# Patient Record
Sex: MALE | Race: WHITE | NOT HISPANIC OR LATINO | Employment: OTHER | URBAN - METROPOLITAN AREA
[De-identification: names, ages, dates, MRNs, and addresses within clinical notes are randomized per-mention and may not be internally consistent; named-entity substitution may affect disease eponyms.]

---

## 2023-03-06 LAB
ALBUMIN (G/DL) IN SER/PLAS: 4.2 G/DL (ref 3.4–5)
ANION GAP IN SER/PLAS: 11 MMOL/L (ref 10–20)
APPEARANCE, URINE: CLEAR
BILIRUBIN, URINE: NEGATIVE
BLOOD, URINE: NEGATIVE
CALCIUM (MG/DL) IN SER/PLAS: 10.4 MG/DL (ref 8.6–10.3)
CARBON DIOXIDE, TOTAL (MMOL/L) IN SER/PLAS: 31 MMOL/L (ref 21–32)
CHLORIDE (MMOL/L) IN SER/PLAS: 101 MMOL/L (ref 98–107)
COLOR, URINE: YELLOW
CREATININE (MG/DL) IN SER/PLAS: 1.69 MG/DL (ref 0.5–1.3)
CREATININE (MG/DL) IN URINE: 108 MG/DL (ref 20–370)
ERYTHROCYTE DISTRIBUTION WIDTH (RATIO) BY AUTOMATED COUNT: 14.6 % (ref 11.5–14.5)
ERYTHROCYTE MEAN CORPUSCULAR HEMOGLOBIN CONCENTRATION (G/DL) BY AUTOMATED: 32.1 G/DL (ref 32–36)
ERYTHROCYTE MEAN CORPUSCULAR VOLUME (FL) BY AUTOMATED COUNT: 94 FL (ref 80–100)
ERYTHROCYTES (10*6/UL) IN BLOOD BY AUTOMATED COUNT: 4.68 X10E12/L (ref 4.5–5.9)
GFR MALE: 39 ML/MIN/1.73M2
GLUCOSE (MG/DL) IN SER/PLAS: 102 MG/DL (ref 74–99)
GLUCOSE, URINE: NEGATIVE MG/DL
HEMATOCRIT (%) IN BLOOD BY AUTOMATED COUNT: 43.9 % (ref 41–52)
HEMOGLOBIN (G/DL) IN BLOOD: 14.1 G/DL (ref 13.5–17.5)
HYALINE CASTS, URINE: ABNORMAL /LPF
KETONES, URINE: NEGATIVE MG/DL
LEUKOCYTE ESTERASE, URINE: NEGATIVE
LEUKOCYTES (10*3/UL) IN BLOOD BY AUTOMATED COUNT: 7 X10E9/L (ref 4.4–11.3)
MUCUS, URINE: ABNORMAL /LPF
NITRITE, URINE: NEGATIVE
PH, URINE: 5 (ref 5–8)
PHOSPHATE (MG/DL) IN SER/PLAS: 3.3 MG/DL (ref 2.5–4.9)
PLATELETS (10*3/UL) IN BLOOD AUTOMATED COUNT: 199 X10E9/L (ref 150–450)
POTASSIUM (MMOL/L) IN SER/PLAS: 3.8 MMOL/L (ref 3.5–5.3)
PROTEIN (MG/DL) IN URINE: 103 MG/DL (ref 5–25)
PROTEIN, URINE: ABNORMAL MG/DL
PROTEIN/CREATININE (MG/MG) IN URINE: 0.95 MG/MG CREAT (ref 0–0.17)
RBC, URINE: 1 /HPF (ref 0–5)
SODIUM (MMOL/L) IN SER/PLAS: 139 MMOL/L (ref 136–145)
SPECIFIC GRAVITY, URINE: 1.02 (ref 1–1.03)
SQUAMOUS EPITHELIAL CELLS, URINE: <1 /HPF
UREA NITROGEN (MG/DL) IN SER/PLAS: 35 MG/DL (ref 6–23)
UROBILINOGEN, URINE: <2 MG/DL (ref 0–1.9)
WBC, URINE: <1 /HPF (ref 0–5)

## 2023-05-31 PROBLEM — B36.9 OTOMYCOSIS: Status: ACTIVE | Noted: 2023-05-31

## 2023-05-31 PROBLEM — I10 HTN (HYPERTENSION): Status: ACTIVE | Noted: 2023-05-31

## 2023-05-31 PROBLEM — E78.5 HYPERLIPIDEMIA: Status: ACTIVE | Noted: 2023-05-31

## 2023-05-31 PROBLEM — M10.9 GOUT: Status: ACTIVE | Noted: 2023-05-31

## 2023-05-31 PROBLEM — H40.9 GLAUCOMA: Status: ACTIVE | Noted: 2023-05-31

## 2023-05-31 PROBLEM — R79.89 LOW VITAMIN D LEVEL: Status: ACTIVE | Noted: 2023-05-31

## 2023-05-31 PROBLEM — E07.9 THYROID DISORDER: Status: ACTIVE | Noted: 2023-05-31

## 2023-05-31 PROBLEM — H40.9 GLAUCOMA: Status: RESOLVED | Noted: 2023-05-31 | Resolved: 2023-05-31

## 2023-05-31 PROBLEM — N32.81 OVERACTIVE BLADDER: Status: ACTIVE | Noted: 2023-05-31

## 2023-05-31 PROBLEM — R73.9 HYPERGLYCEMIA: Status: ACTIVE | Noted: 2023-05-31

## 2023-05-31 PROBLEM — N18.31 CKD STAGE G3A/A2, GFR 45-59 AND ALBUMIN CREATININE RATIO 30-299 MG/G (MULTI): Status: ACTIVE | Noted: 2023-05-31

## 2023-05-31 PROBLEM — H62.40 OTOMYCOSIS: Status: ACTIVE | Noted: 2023-05-31

## 2023-05-31 PROBLEM — C61 MALIGNANT NEOPLASM OF PROSTATE (MULTI): Status: ACTIVE | Noted: 2023-05-31

## 2023-06-06 LAB
ALBUMIN (G/DL) IN SER/PLAS: 4.2 G/DL (ref 3.4–5)
ANION GAP IN SER/PLAS: 10 MMOL/L (ref 10–20)
APPEARANCE, URINE: CLEAR
BILIRUBIN, URINE: NEGATIVE
BLOOD, URINE: NEGATIVE
CALCIDIOL (25 OH VITAMIN D3) (NG/ML) IN SER/PLAS: 32 NG/ML
CALCIUM (MG/DL) IN SER/PLAS: 10.3 MG/DL (ref 8.6–10.3)
CARBON DIOXIDE, TOTAL (MMOL/L) IN SER/PLAS: 30 MMOL/L (ref 21–32)
CHLORIDE (MMOL/L) IN SER/PLAS: 106 MMOL/L (ref 98–107)
COLOR, URINE: YELLOW
CREATININE (MG/DL) IN SER/PLAS: 1.34 MG/DL (ref 0.5–1.3)
CREATININE (MG/DL) IN URINE: 84.9 MG/DL (ref 20–370)
GFR MALE: 51 ML/MIN/1.73M2
GLUCOSE (MG/DL) IN SER/PLAS: 99 MG/DL (ref 74–99)
GLUCOSE, URINE: NEGATIVE MG/DL
KETONES, URINE: NEGATIVE MG/DL
LEUKOCYTE ESTERASE, URINE: NEGATIVE
MUCUS, URINE: NORMAL /LPF
NITRITE, URINE: NEGATIVE
PARATHYRIN INTACT (PG/ML) IN SER/PLAS: 73.4 PG/ML (ref 18.5–88)
PH, URINE: 5 (ref 5–8)
PHOSPHATE (MG/DL) IN SER/PLAS: 3.6 MG/DL (ref 2.5–4.9)
POTASSIUM (MMOL/L) IN SER/PLAS: 3.9 MMOL/L (ref 3.5–5.3)
PROTEIN (MG/DL) IN URINE: 122 MG/DL (ref 5–25)
PROTEIN, URINE: ABNORMAL MG/DL
PROTEIN/CREATININE (MG/MG) IN URINE: 1.44 MG/MG CREAT (ref 0–0.17)
RBC, URINE: <1 /HPF (ref 0–5)
SODIUM (MMOL/L) IN SER/PLAS: 142 MMOL/L (ref 136–145)
SPECIFIC GRAVITY, URINE: 1.02 (ref 1–1.03)
SQUAMOUS EPITHELIAL CELLS, URINE: <1 /HPF
UREA NITROGEN (MG/DL) IN SER/PLAS: 36 MG/DL (ref 6–23)
UROBILINOGEN, URINE: <2 MG/DL (ref 0–1.9)
WBC, URINE: 1 /HPF (ref 0–5)

## 2023-06-08 LAB
ALBUMIN ELP: 3.9 G/DL (ref 3.4–5)
ALPHA 1: 0.3 G/DL (ref 0.2–0.6)
ALPHA 2: 0.8 G/DL (ref 0.4–1.1)
BETA: 0.8 G/DL (ref 0.5–1.2)
GAMMA GLOBULIN: 1.3 G/DL (ref 0.5–1.4)
PATH REVIEW-SERUM PROTEIN ELECTROPHORESIS: NORMAL
PROTEIN ELECTROPHORESIS INTERPRETATION: NORMAL
PROTEIN TOTAL: 7.1 G/DL (ref 6.4–8.2)

## 2023-06-14 ENCOUNTER — OFFICE VISIT (OUTPATIENT)
Dept: PRIMARY CARE | Facility: CLINIC | Age: 88
End: 2023-06-14
Payer: MEDICARE

## 2023-06-14 VITALS
TEMPERATURE: 97.8 F | HEART RATE: 51 BPM | OXYGEN SATURATION: 97 % | SYSTOLIC BLOOD PRESSURE: 122 MMHG | RESPIRATION RATE: 18 BRPM | DIASTOLIC BLOOD PRESSURE: 64 MMHG | BODY MASS INDEX: 38.22 KG/M2 | WEIGHT: 267 LBS | HEIGHT: 70 IN

## 2023-06-14 DIAGNOSIS — E66.01 OBESITY, MORBID (MULTI): Primary | ICD-10-CM

## 2023-06-14 DIAGNOSIS — Z00.00 ENCOUNTER FOR MEDICARE ANNUAL WELLNESS EXAM: ICD-10-CM

## 2023-06-14 DIAGNOSIS — I10 HYPERTENSION, UNSPECIFIED TYPE: ICD-10-CM

## 2023-06-14 DIAGNOSIS — E78.2 MIXED HYPERLIPIDEMIA: ICD-10-CM

## 2023-06-14 DIAGNOSIS — R73.9 HYPERGLYCEMIA: ICD-10-CM

## 2023-06-14 DIAGNOSIS — Z12.5 PROSTATE CANCER SCREENING: ICD-10-CM

## 2023-06-14 DIAGNOSIS — N18.31 CKD STAGE G3A/A2, GFR 45-59 AND ALBUMIN CREATININE RATIO 30-299 MG/G (MULTI): ICD-10-CM

## 2023-06-14 DIAGNOSIS — M10.9 GOUT, UNSPECIFIED CAUSE, UNSPECIFIED CHRONICITY, UNSPECIFIED SITE: ICD-10-CM

## 2023-06-14 DIAGNOSIS — E07.9 THYROID DISORDER: ICD-10-CM

## 2023-06-14 DIAGNOSIS — C61 MALIGNANT NEOPLASM OF PROSTATE (MULTI): ICD-10-CM

## 2023-06-14 PROBLEM — N18.9 CKD (CHRONIC KIDNEY DISEASE): Status: ACTIVE | Noted: 2021-05-20

## 2023-06-14 PROBLEM — H40.89 OTHER SPECIFIED GLAUCOMA: Status: ACTIVE | Noted: 2021-05-20

## 2023-06-14 PROBLEM — M17.11 PRIMARY OSTEOARTHRITIS OF RIGHT KNEE: Status: ACTIVE | Noted: 2021-05-12

## 2023-06-14 PROBLEM — E78.5 HLD (HYPERLIPIDEMIA): Status: ACTIVE | Noted: 2021-05-20

## 2023-06-14 PROBLEM — M17.12 PRIMARY OSTEOARTHRITIS OF LEFT KNEE: Status: ACTIVE | Noted: 2021-05-12

## 2023-06-14 PROBLEM — E66.9 OBESITY (BMI 30-39.9): Status: ACTIVE | Noted: 2021-05-20

## 2023-06-14 PROCEDURE — 1159F MED LIST DOCD IN RCRD: CPT | Performed by: FAMILY MEDICINE

## 2023-06-14 PROCEDURE — 1170F FXNL STATUS ASSESSED: CPT | Performed by: FAMILY MEDICINE

## 2023-06-14 PROCEDURE — 3074F SYST BP LT 130 MM HG: CPT | Performed by: FAMILY MEDICINE

## 2023-06-14 PROCEDURE — G0439 PPPS, SUBSEQ VISIT: HCPCS | Performed by: FAMILY MEDICINE

## 2023-06-14 PROCEDURE — 3078F DIAST BP <80 MM HG: CPT | Performed by: FAMILY MEDICINE

## 2023-06-14 PROCEDURE — 1036F TOBACCO NON-USER: CPT | Performed by: FAMILY MEDICINE

## 2023-06-14 PROCEDURE — 99213 OFFICE O/P EST LOW 20 MIN: CPT | Performed by: FAMILY MEDICINE

## 2023-06-14 RX ORDER — ATORVASTATIN CALCIUM 20 MG/1
20 TABLET, FILM COATED ORAL DAILY
COMMUNITY
End: 2023-11-15 | Stop reason: SDUPTHER

## 2023-06-14 RX ORDER — HYDRALAZINE HYDROCHLORIDE 50 MG/1
50 TABLET, FILM COATED ORAL 3 TIMES DAILY
COMMUNITY
End: 2023-11-15 | Stop reason: SDUPTHER

## 2023-06-14 RX ORDER — GLUCOSAMINE/CHONDR SU A SOD 167-133 MG
1000 CAPSULE ORAL
COMMUNITY

## 2023-06-14 RX ORDER — ZINC GLUCONATE 50 MG
TABLET ORAL
COMMUNITY
Start: 2018-02-13

## 2023-06-14 RX ORDER — METOPROLOL SUCCINATE 100 MG/1
100 TABLET, EXTENDED RELEASE ORAL DAILY
COMMUNITY
End: 2023-11-15 | Stop reason: SDUPTHER

## 2023-06-14 RX ORDER — LOSARTAN POTASSIUM 100 MG/1
100 TABLET ORAL DAILY
COMMUNITY
End: 2023-11-15 | Stop reason: SDUPTHER

## 2023-06-14 RX ORDER — ACETAMINOPHEN 500 MG
TABLET ORAL
COMMUNITY

## 2023-06-14 RX ORDER — LEVOTHYROXINE SODIUM 50 UG/1
50 TABLET ORAL
COMMUNITY
End: 2023-11-15 | Stop reason: SDUPTHER

## 2023-06-14 RX ORDER — HYDROCHLOROTHIAZIDE 25 MG/1
25 TABLET ORAL
COMMUNITY
Start: 2023-05-29 | End: 2023-11-15 | Stop reason: SDUPTHER

## 2023-06-14 RX ORDER — FLAXSEED OIL 1000 MG
2000 CAPSULE ORAL
COMMUNITY
Start: 2018-02-13

## 2023-06-14 RX ORDER — MULTIVITAMIN
TABLET ORAL
COMMUNITY

## 2023-06-14 RX ORDER — AMLODIPINE BESYLATE 5 MG/1
5 TABLET ORAL 2 TIMES DAILY
COMMUNITY
End: 2023-11-15 | Stop reason: SDUPTHER

## 2023-06-14 RX ORDER — ASPIRIN 81 MG/1
81 TABLET ORAL DAILY
COMMUNITY

## 2023-06-14 ASSESSMENT — PATIENT HEALTH QUESTIONNAIRE - PHQ9
1. LITTLE INTEREST OR PLEASURE IN DOING THINGS: NOT AT ALL
2. FEELING DOWN, DEPRESSED OR HOPELESS: NOT AT ALL
SUM OF ALL RESPONSES TO PHQ9 QUESTIONS 1 AND 2: 0

## 2023-06-14 ASSESSMENT — ENCOUNTER SYMPTOMS
DEPRESSION: 0
LOSS OF SENSATION IN FEET: 0
OCCASIONAL FEELINGS OF UNSTEADINESS: 1

## 2023-06-14 ASSESSMENT — ACTIVITIES OF DAILY LIVING (ADL)
BATHING: INDEPENDENT
DRESSING: INDEPENDENT
MANAGING_FINANCES: INDEPENDENT
GROCERY_SHOPPING: INDEPENDENT
DOING_HOUSEWORK: INDEPENDENT
TAKING_MEDICATION: INDEPENDENT

## 2023-06-14 NOTE — PROGRESS NOTES
Subjective   Reason for Visit: Jl Brand is a 87 y.o. male here for a Medicare Wellness visit.   Covid vax: x 4  CRC: 2017 wnl  CHECKLIST REVIEWED AND COMPLETE FOR AMW  No depression  No dementia  Non smoker  Full code desired  Hba1c 5.4  Sees nephrology  Offered cardio    Past Medical, Surgical, and Family History reviewed and updated in chart.    Reviewed all medications by prescribing practitioner or clinical pharmacist (such as prescriptions, OTCs, herbal therapies and supplements) and documented in the medical record.  Medicare Annual Wellness Visit Subsequent, Hypertension, Hyperlipidemia, Hypothyroidism, and Chronic Kidney Disease  HPI    Patient Self Assessment of Health Status  Patient Self Assessment: Good    Nutrition and Exercise  Current Diet: HEALTHY Diet always best, minimizing excess carbs  Adequate Fluid Intake: Yes  Caffeine: -aware to minimize intake  Exercise Frequency: Regularly advised    Functional Ability/Level of Safety  Cognitive Impairment Observed: No cognitive impairment observed    Home Safety Risk Factors: None    Patient Care Team:  Helen Hooper MD as PCP - General    HPI  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 and albumin creatinine ratio  mg/g    Gout    HTN (hypertension)    Hyperglycemia    Hyperlipidemia    Low vitamin D level    Malignant neoplasm of prostate (CMS/[facility])    Otomycosis    Overactive bladder    Thyroid disorder    Obesity, morbid (CMS/[facility])    Obesity (BMI 30-39.9)    Primary osteoarthritis of left knee    Primary osteoarthritis of right knee    CKD (chronic kidney disease)    Other specified glaucoma    Essential hypertension    HLD (hyperlipidemia)      Past Surgical History:   Procedure Laterality Date    CATARACT EXTRACTION  2015    COLONOSCOPY  2017    TOTAL KNEE ARTHROPLASTY Right 2021       Review of Systems no cva no cad  No new issues  Sees nephro-saw today    This patient has   NO history of recent Covid  "nor flu symptoms,  NO Fever nor chills,  NO Chest pain, shortness of breath nor paroxysmal nocturnal dyspnea,  NO Nausea, vomiting, nor diarrhea,  NO Hematochezia nor melena,  NO Dysuria, hematuria, nor new incontinence issues  NO new severe headaches nor neurological complaints,  NO new issues with anxiety nor depression nor new psychiatric complaints,  NO suicidal nor homicidal ideations.     OBJECTIVE:  /64   Pulse 51   Temp 36.6 °C (97.8 °F) (Temporal)   Resp 18   Ht 1.778 m (5' 10\")   Wt 121 kg (267 lb)   SpO2 97%   BMI 38.31 kg/m²      General:  alert, oriented, no acute distress.  No obvious skin rashes noted.   No gait disturbance noted.    Mood is pleasant, not tearful, no signs of emotional distress.  Not appearing intoxicated or altered.   No voiced delusions,   Normal, appropriate behavior.    HEENT: Normocephalic, atraumatic,   Pupils round, reactive to light  Extraocular motions intact and wnl  Tympanic membranes normal    Neck: no nuchal rigidity  No masses palpable.  No carotid bruits.  No thyromegaly.    Respiratory: Equal breath sounds  No wheezes,    rales,    nor rhonchi  No respiratory distress.    Heart: Regular rate and rhythm, no    murmurs  no rubs/gallops    Abdomen: no masses palpable, no rebound nor guarding, no rebound nor guarding overwt.    Extremities: NO cyanosis noted, no clubbing.   BLE mild edema noted.  2+dorsalis pedis pulses.    Normal-not antalgic, steady gait.  H of h  Orders Only on 06/06/2023   Component Date Value Ref Range Status    Glucose 06/06/2023 99  74 - 99 mg/dL Final    Sodium 06/06/2023 142  136 - 145 mmol/L Final    Potassium 06/06/2023 3.9  3.5 - 5.3 mmol/L Final    Chloride 06/06/2023 106  98 - 107 mmol/L Final    Bicarbonate 06/06/2023 30  21 - 32 mmol/L Final    Anion Gap 06/06/2023 10  10 - 20 mmol/L Final    Urea Nitrogen 06/06/2023 36 (H)  6 - 23 mg/dL Final    Creatinine 06/06/2023 1.34 (H)  0.50 - 1.30 mg/dL Final    GFR MALE 06/06/2023 51 " (A)  >90 mL/min/1.73m2 Final    Comment:  CALCULATIONS OF ESTIMATED GFR ARE PERFORMED   USING THE 2021 CKD-EPI STUDY REFIT EQUATION   WITHOUT THE RACE VARIABLE FOR THE IDMS-TRACEABLE   CREATININE METHODS.    https://jasn.asnjournals.org/content/early/2021/09/22/ASN.8098614961      Calcium 06/06/2023 10.3  8.6 - 10.3 mg/dL Final    Phosphorus 06/06/2023 3.6  2.5 - 4.9 mg/dL Final    Comment:  The performance characteristics of phosphorus testing in   heparinized plasma have been validated by the individual     laboratory site where testing is performed. Testing    on heparinized plasma is not approved by the FDA;    however, such approval is not necessary.      Albumin 06/06/2023 4.2  3.4 - 5.0 g/dL Final    Color, Urine 06/06/2023 YELLOW  STRAW,YELLOW Final    Appearance, Urine 06/06/2023 CLEAR  CLEAR Final    Specific Gravity, Urine 06/06/2023 1.016  1.005 - 1.035 Final    pH, Urine 06/06/2023 5.0  5.0 - 8.0 Final    Protein, Urine 06/06/2023 100 (2+) (A)  NEGATIVE mg/dL Final    Glucose, Urine 06/06/2023 NEGATIVE  NEGATIVE mg/dL Final    Blood, Urine 06/06/2023 NEGATIVE  NEGATIVE Final    Ketones, Urine 06/06/2023 NEGATIVE  NEGATIVE mg/dL Final    Bilirubin, Urine 06/06/2023 NEGATIVE  NEGATIVE Final    Urobilinogen, Urine 06/06/2023 <2.0  0.0 - 1.9 mg/dL Final    Nitrite, Urine 06/06/2023 NEGATIVE  NEGATIVE Final    Leukocyte Esterase, Urine 06/06/2023 NEGATIVE  NEGATIVE Final    PTH 06/06/2023 73.4  18.5 - 88.0 pg/mL Final    Protein, Ur 06/06/2023 122 (H)  5 - 25 mg/dL Final    Creatinine, Urine 06/06/2023 84.9  20.0 - 370.0 mg/dL Final    Prot/Creat, Ur 06/06/2023 1.44 (H)  0.00 - 0.17 mg/mg Creat Final    Vitamin D, 25-Hydroxy 06/06/2023 32  ng/mL Final    Comment: .  DEFICIENCY:         < 20   NG/ML  INSUFFICIENCY:      20-29  NG/ML  SUFFICIENCY:         NG/ML    THIS ASSAY ACCURATELY QUANTIFIES THE SUM OF  VITAMIN D3, 25-HYDROXY AND VIT D2,25-HYDROXY.      Total Protein 06/06/2023 7.1  6.4 - 8.2 g/dL  Final    Albumin 06/06/2023 3.9  3.4 - 5.0 g/dL Final    Alpha 1 06/06/2023 0.3  0.2 - 0.6 g/dL Final    Alpha 2 06/06/2023 0.8  0.4 - 1.1 g/dL Final    Beta 06/06/2023 0.8  0.5 - 1.2 g/dL Final    Gamma 06/06/2023 1.3  0.5 - 1.4 g/dL Final    SPE Interpretation 06/06/2023 NORMAL   Final    Path Review - Serum Protein Electr* 06/06/2023 L.[email or web address]   Final    Comment:  By her/his signature above, the Pathologist   listed as making the final interpretation   certifies that she/he has personally reviewed    this case.  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 and albumin creatinine ratio  mg/g    Relevant Orders    Comprehensive Metabolic Panel    Lipid Panel    Gout    HTN (hypertension)    Hyperglycemia    Relevant Orders    Hemoglobin A1C    Hyperlipidemia    Relevant Orders    CBC and Auto Differential    Comprehensive Metabolic Panel    Lipid Panel    Malignant neoplasm of prostate (CMS/HCC)    Thyroid disorder    Relevant Orders    Thyroid Stimulating Hormone    Thyroxine, Free    Obesity, morbid (CMS/HCC) - Primary    Relevant Orders    Hemoglobin A1C     Other Visit Diagnoses       Encounter for Medicare annual wellness exam        Prostate cancer screening        Relevant Orders    Prostate Spec.Ag,Screen          Advance Care Planning Note   Discussion Date: 06/14/23   Discussion Participants: patient    The patient wishes to discuss Advance Care Planning today and the following is a brief summary of our discussion.     Patient has capacity to make their own medical decisions: Yes  Health Care Agent/Surrogate Decision Maker documented in chart: Yes  Documents on file and valid:  Advance Directive/Living  Will: Yes   Health Care Power of : Yes  Communication of Medical Status/Prognosis:   yes   Communication of Treatment Goals/Options:   yes  Treatment Decisions  yes  Time Statement: Total face to face time spent on advance care planning was <30 minutes with <30 minutes spent in counseling, including the explanation.    SEE ME AT NEXT REGULARLY SCHEDULED VISIT-sooner if condition deteriorates or new problems arise.    See me 4mo and labs first  The patient is aware that results will be forthcoming of ALL planned labs and or tests. If no results are received on my chart or by letter within 1 - 3 weeks, the patient is aware they need to call to obtain results, as this is not usual. Also, if any new conditions arise, or current condition worsens, it is understood that sooner appointment should be made or urgent care/convenient care or emergency room treatment should be sought depending on severity. Otherwise follow up for recheck at regular intervals as we have discussed, at least yearly.  Offered cardio-declines for now-no sxs

## 2023-11-07 ENCOUNTER — LAB (OUTPATIENT)
Dept: LAB | Facility: LAB | Age: 88
End: 2023-11-07
Payer: MEDICARE

## 2023-11-07 DIAGNOSIS — R73.9 HYPERGLYCEMIA: ICD-10-CM

## 2023-11-07 DIAGNOSIS — E66.01 OBESITY, MORBID (MULTI): ICD-10-CM

## 2023-11-07 DIAGNOSIS — E07.9 THYROID DISORDER: ICD-10-CM

## 2023-11-07 DIAGNOSIS — E78.2 MIXED HYPERLIPIDEMIA: ICD-10-CM

## 2023-11-07 DIAGNOSIS — Z12.5 PROSTATE CANCER SCREENING: ICD-10-CM

## 2023-11-07 DIAGNOSIS — N18.31 CKD STAGE G3A/A2, GFR 45-59 AND ALBUMIN CREATININE RATIO 30-299 MG/G (MULTI): ICD-10-CM

## 2023-11-07 LAB
ALBUMIN SERPL BCP-MCNC: 4.2 G/DL (ref 3.4–5)
ALP SERPL-CCNC: 68 U/L (ref 33–136)
ALT SERPL W P-5'-P-CCNC: 18 U/L (ref 10–52)
ANION GAP SERPL CALC-SCNC: 12 MMOL/L (ref 10–20)
AST SERPL W P-5'-P-CCNC: 12 U/L (ref 9–39)
BASOPHILS # BLD AUTO: 0.07 X10*3/UL (ref 0–0.1)
BASOPHILS NFR BLD AUTO: 1 %
BILIRUB SERPL-MCNC: 0.8 MG/DL (ref 0–1.2)
BUN SERPL-MCNC: 27 MG/DL (ref 6–23)
CALCIUM SERPL-MCNC: 10.1 MG/DL (ref 8.6–10.3)
CHLORIDE SERPL-SCNC: 103 MMOL/L (ref 98–107)
CHOLEST SERPL-MCNC: 112 MG/DL (ref 0–199)
CHOLESTEROL/HDL RATIO: 3.2
CO2 SERPL-SCNC: 29 MMOL/L (ref 21–32)
CREAT SERPL-MCNC: 1.7 MG/DL (ref 0.5–1.3)
EOSINOPHIL # BLD AUTO: 0.23 X10*3/UL (ref 0–0.4)
EOSINOPHIL NFR BLD AUTO: 3.4 %
ERYTHROCYTE [DISTWIDTH] IN BLOOD BY AUTOMATED COUNT: 14.2 % (ref 11.5–14.5)
EST. AVERAGE GLUCOSE BLD GHB EST-MCNC: 108 MG/DL
GFR SERPL CREATININE-BSD FRML MDRD: 39 ML/MIN/1.73M*2
GLUCOSE SERPL-MCNC: 93 MG/DL (ref 74–99)
HBA1C MFR BLD: 5.4 %
HCT VFR BLD AUTO: 43.6 % (ref 41–52)
HDLC SERPL-MCNC: 35.4 MG/DL
HGB BLD-MCNC: 14.5 G/DL (ref 13.5–17.5)
IMM GRANULOCYTES # BLD AUTO: 0.03 X10*3/UL (ref 0–0.5)
IMM GRANULOCYTES NFR BLD AUTO: 0.4 % (ref 0–0.9)
LDLC SERPL CALC-MCNC: 54 MG/DL
LYMPHOCYTES # BLD AUTO: 1.92 X10*3/UL (ref 0.8–3)
LYMPHOCYTES NFR BLD AUTO: 28.3 %
MCH RBC QN AUTO: 30.7 PG (ref 26–34)
MCHC RBC AUTO-ENTMCNC: 33.3 G/DL (ref 32–36)
MCV RBC AUTO: 92 FL (ref 80–100)
MONOCYTES # BLD AUTO: 0.62 X10*3/UL (ref 0.05–0.8)
MONOCYTES NFR BLD AUTO: 9.1 %
NEUTROPHILS # BLD AUTO: 3.91 X10*3/UL (ref 1.6–5.5)
NEUTROPHILS NFR BLD AUTO: 57.8 %
NON HDL CHOLESTEROL: 77 MG/DL (ref 0–149)
NRBC BLD-RTO: 0 /100 WBCS (ref 0–0)
PLATELET # BLD AUTO: 193 X10*3/UL (ref 150–450)
POTASSIUM SERPL-SCNC: 3.9 MMOL/L (ref 3.5–5.3)
PROT SERPL-MCNC: 7.2 G/DL (ref 6.4–8.2)
PSA SERPL-MCNC: <0.01 NG/ML
RBC # BLD AUTO: 4.73 X10*6/UL (ref 4.5–5.9)
SODIUM SERPL-SCNC: 140 MMOL/L (ref 136–145)
T4 FREE SERPL-MCNC: 0.95 NG/DL (ref 0.61–1.12)
TRIGL SERPL-MCNC: 112 MG/DL (ref 0–149)
TSH SERPL-ACNC: 4.43 MIU/L (ref 0.44–3.98)
VLDL: 22 MG/DL (ref 0–40)
WBC # BLD AUTO: 6.8 X10*3/UL (ref 4.4–11.3)

## 2023-11-07 PROCEDURE — 80053 COMPREHEN METABOLIC PANEL: CPT

## 2023-11-07 PROCEDURE — 84443 ASSAY THYROID STIM HORMONE: CPT

## 2023-11-07 PROCEDURE — 83036 HEMOGLOBIN GLYCOSYLATED A1C: CPT

## 2023-11-07 PROCEDURE — G0103 PSA SCREENING: HCPCS

## 2023-11-07 PROCEDURE — 80061 LIPID PANEL: CPT

## 2023-11-07 PROCEDURE — 85025 COMPLETE CBC W/AUTO DIFF WBC: CPT

## 2023-11-07 PROCEDURE — 84439 ASSAY OF FREE THYROXINE: CPT

## 2023-11-07 PROCEDURE — 36415 COLL VENOUS BLD VENIPUNCTURE: CPT

## 2023-11-09 PROBLEM — N18.9 CKD (CHRONIC KIDNEY DISEASE): Status: RESOLVED | Noted: 2021-05-20 | Resolved: 2023-11-09

## 2023-11-09 PROBLEM — E78.5 HYPERLIPIDEMIA: Status: RESOLVED | Noted: 2023-05-31 | Resolved: 2023-11-09

## 2023-11-09 PROBLEM — I10 HTN (HYPERTENSION): Status: RESOLVED | Noted: 2023-05-31 | Resolved: 2023-11-09

## 2023-11-15 ENCOUNTER — OFFICE VISIT (OUTPATIENT)
Dept: PRIMARY CARE | Facility: CLINIC | Age: 88
End: 2023-11-15
Payer: MEDICARE

## 2023-11-15 VITALS
HEART RATE: 50 BPM | SYSTOLIC BLOOD PRESSURE: 126 MMHG | TEMPERATURE: 97.5 F | HEIGHT: 70 IN | RESPIRATION RATE: 16 BRPM | WEIGHT: 264 LBS | DIASTOLIC BLOOD PRESSURE: 72 MMHG | BODY MASS INDEX: 37.8 KG/M2 | OXYGEN SATURATION: 97 %

## 2023-11-15 DIAGNOSIS — C61 MALIGNANT NEOPLASM OF PROSTATE (MULTI): ICD-10-CM

## 2023-11-15 DIAGNOSIS — N18.31 CKD STAGE G3A/A2, GFR 45-59 AND ALBUMIN CREATININE RATIO 30-299 MG/G (MULTI): ICD-10-CM

## 2023-11-15 DIAGNOSIS — E78.5 HYPERLIPIDEMIA, UNSPECIFIED HYPERLIPIDEMIA TYPE: ICD-10-CM

## 2023-11-15 DIAGNOSIS — E07.9 THYROID DISORDER: ICD-10-CM

## 2023-11-15 DIAGNOSIS — I10 ESSENTIAL HYPERTENSION: ICD-10-CM

## 2023-11-15 DIAGNOSIS — Z23 NEED FOR VACCINATION: ICD-10-CM

## 2023-11-15 DIAGNOSIS — R73.9 HYPERGLYCEMIA: ICD-10-CM

## 2023-11-15 PROCEDURE — G0008 ADMIN INFLUENZA VIRUS VAC: HCPCS | Performed by: FAMILY MEDICINE

## 2023-11-15 PROCEDURE — 3074F SYST BP LT 130 MM HG: CPT | Performed by: FAMILY MEDICINE

## 2023-11-15 PROCEDURE — 99214 OFFICE O/P EST MOD 30 MIN: CPT | Performed by: FAMILY MEDICINE

## 2023-11-15 PROCEDURE — 90480 ADMN SARSCOV2 VAC 1/ONLY CMP: CPT | Performed by: FAMILY MEDICINE

## 2023-11-15 PROCEDURE — 90662 IIV NO PRSV INCREASED AG IM: CPT | Performed by: FAMILY MEDICINE

## 2023-11-15 PROCEDURE — 1159F MED LIST DOCD IN RCRD: CPT | Performed by: FAMILY MEDICINE

## 2023-11-15 PROCEDURE — 1036F TOBACCO NON-USER: CPT | Performed by: FAMILY MEDICINE

## 2023-11-15 PROCEDURE — 3078F DIAST BP <80 MM HG: CPT | Performed by: FAMILY MEDICINE

## 2023-11-15 PROCEDURE — 91322 SARSCOV2 VAC 50 MCG/0.5ML IM: CPT | Performed by: FAMILY MEDICINE

## 2023-11-15 PROCEDURE — 1160F RVW MEDS BY RX/DR IN RCRD: CPT | Performed by: FAMILY MEDICINE

## 2023-11-15 RX ORDER — METOPROLOL SUCCINATE 100 MG/1
100 TABLET, EXTENDED RELEASE ORAL DAILY
Qty: 90 TABLET | Refills: 3 | Status: SHIPPED | OUTPATIENT
Start: 2023-11-15

## 2023-11-15 RX ORDER — ATORVASTATIN CALCIUM 20 MG/1
20 TABLET, FILM COATED ORAL DAILY
Qty: 90 TABLET | Refills: 3 | Status: SHIPPED | OUTPATIENT
Start: 2023-11-15

## 2023-11-15 RX ORDER — AMLODIPINE BESYLATE 5 MG/1
5 TABLET ORAL 2 TIMES DAILY
Qty: 90 TABLET | Refills: 3 | Status: SHIPPED | OUTPATIENT
Start: 2023-11-15 | End: 2024-03-26 | Stop reason: SDUPTHER

## 2023-11-15 RX ORDER — LEVOTHYROXINE SODIUM 50 UG/1
50 TABLET ORAL
Qty: 90 TABLET | Refills: 3 | Status: SHIPPED | OUTPATIENT
Start: 2023-11-15

## 2023-11-15 RX ORDER — HYDROCHLOROTHIAZIDE 25 MG/1
25 TABLET ORAL
Qty: 90 TABLET | Refills: 3 | Status: SHIPPED | OUTPATIENT
Start: 2023-11-15

## 2023-11-15 RX ORDER — LOSARTAN POTASSIUM 100 MG/1
100 TABLET ORAL DAILY
Qty: 90 TABLET | Refills: 3 | Status: SHIPPED | OUTPATIENT
Start: 2023-11-15

## 2023-11-15 RX ORDER — HYDRALAZINE HYDROCHLORIDE 50 MG/1
50 TABLET, FILM COATED ORAL 3 TIMES DAILY
Qty: 270 TABLET | Refills: 3 | Status: SHIPPED | OUTPATIENT
Start: 2023-11-15

## 2023-11-15 NOTE — PROGRESS NOTES
"Subjective   Patient ID: Jl Brand is a 87 y.o. male who presents for Hyperglycemia, Hypertension, and Hyperlipidemia.  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 and albumin creatinine ratio  mg/g (CMS/HCC)    Gout    Hyperglycemia    Low vitamin D level    Malignant neoplasm of prostate (CMS/[facility])    Otomycosis    Overactive bladder    Thyroid disorder    Obesity, morbid (CMS/[facility])    Obesity (BMI 30-39.9)    Primary osteoarthritis of left knee    Primary osteoarthritis of right knee    Other specified glaucoma    Essential hypertension    HLD (hyperlipidemia)       Past Surgical History:   Procedure Laterality Date    CATARACT EXTRACTION  2015    COLONOSCOPY  2017    TOTAL KNEE ARTHROPLASTY Right 2021   Labs ok except cr 1.7-sees nephro    Ldl 54      Review of Systems  This patient has   NO history of recent Covid nor flu symptoms,  NO Fever nor chills,  NO Chest pain, shortness of breath nor paroxysmal nocturnal dyspnea,  NO Nausea, vomiting, nor diarrhea,  NO Hematochezia nor melena,  NO Dysuria, hematuria, nor new incontinence issues  NO new severe headaches nor neurological complaints,  NO new issues with anxiety nor depression nor new psychiatric complaints,  NO suicidal nor homicidal ideations.     OBJECTIVE:  /72   Pulse 50   Temp 36.4 °C (97.5 °F) (Temporal)   Resp 16   Ht 1.778 m (5' 10\")   Wt 120 kg (264 lb)   SpO2 97%   BMI 37.88 kg/m²      General:  alert, oriented, no acute distress.  No obvious skin rashes noted.   No gait disturbance noted.    Mood is pleasant, not tearful, no signs of emotional distress.  Not appearing intoxicated or altered.   No voiced delusions,   Normal, appropriate behavior.    HEENT: Normocephalic, atraumatic,   Pupils round, reactive to light  Extraocular motions intact and wnl  Tympanic membranes normal    Neck: no nuchal rigidity  No masses " palpable.  No carotid bruits.  No thyromegaly.    Respiratory: Equal breath sounds  No wheezes,    rales,    nor rhonchi  No respiratory distress.    Heart: Regular rate and rhythm, no    murmurs  no rubs/gallops    Abdomen: no masses palpable, nontender, no rebound nor guarding.overwt    Extremities: NO cyanosis noted, no clubbing.   No edema noted.  2+dorsalis pedis pulses.    Normal-not antalgic, steady gait.    Lab on 11/07/2023   Component Date Value Ref Range Status    WBC 11/07/2023 6.8  4.4 - 11.3 x10*3/uL Final    nRBC 11/07/2023 0.0  0.0 - 0.0 /100 WBCs Final    RBC 11/07/2023 4.73  4.50 - 5.90 x10*6/uL Final    Hemoglobin 11/07/2023 14.5  13.5 - 17.5 g/dL Final    Hematocrit 11/07/2023 43.6  41.0 - 52.0 % Final    MCV 11/07/2023 92  80 - 100 fL Final    MCH 11/07/2023 30.7  26.0 - 34.0 pg Final    MCHC 11/07/2023 33.3  32.0 - 36.0 g/dL Final    RDW 11/07/2023 14.2  11.5 - 14.5 % Final    Platelets 11/07/2023 193  150 - 450 x10*3/uL Final    Neutrophils % 11/07/2023 57.8  40.0 - 80.0 % Final    Immature Granulocytes %, Automated 11/07/2023 0.4  0.0 - 0.9 % Final    Immature Granulocyte Count (IG) includes promyelocytes, myelocytes and metamyelocytes but does not include bands. Percent differential counts (%) should be interpreted in the context of the absolute cell counts (cells/UL).    Lymphocytes % 11/07/2023 28.3  13.0 - 44.0 % Final    Monocytes % 11/07/2023 9.1  2.0 - 10.0 % Final    Eosinophils % 11/07/2023 3.4  0.0 - 6.0 % Final    Basophils % 11/07/2023 1.0  0.0 - 2.0 % Final    Neutrophils Absolute 11/07/2023 3.91  1.60 - 5.50 x10*3/uL Final    Percent differential counts (%) should be interpreted in the context of the absolute cell counts (cells/uL).    Immature Granulocytes Absolute, Au* 11/07/2023 0.03  0.00 - 0.50 x10*3/uL Final    Lymphocytes Absolute 11/07/2023 1.92  0.80 - 3.00 x10*3/uL Final    Monocytes Absolute 11/07/2023 0.62  0.05 - 0.80 x10*3/uL Final    Eosinophils Absolute  11/07/2023 0.23  0.00 - 0.40 x10*3/uL Final    Basophils Absolute 11/07/2023 0.07  0.00 - 0.10 x10*3/uL Final    Glucose 11/07/2023 93  74 - 99 mg/dL Final    Sodium 11/07/2023 140  136 - 145 mmol/L Final    Potassium 11/07/2023 3.9  3.5 - 5.3 mmol/L Final    Chloride 11/07/2023 103  98 - 107 mmol/L Final    Bicarbonate 11/07/2023 29  21 - 32 mmol/L Final    Anion Gap 11/07/2023 12  10 - 20 mmol/L Final    Urea Nitrogen 11/07/2023 27 (H)  6 - 23 mg/dL Final    Creatinine 11/07/2023 1.70 (H)  0.50 - 1.30 mg/dL Final    eGFR 11/07/2023 39 (L)  >60 mL/min/1.73m*2 Final    Calculations of estimated GFR are performed using the 2021 CKD-EPI Study Refit equation without the race variable for the IDMS-Traceable creatinine methods.  https://jasn.asnjournals.org/content/early/2021/09/22/ASN.8647135823    Calcium 11/07/2023 10.1  8.6 - 10.3 mg/dL Final    Albumin 11/07/2023 4.2  3.4 - 5.0 g/dL Final    Alkaline Phosphatase 11/07/2023 68  33 - 136 U/L Final    Total Protein 11/07/2023 7.2  6.4 - 8.2 g/dL Final    AST 11/07/2023 12  9 - 39 U/L Final    Bilirubin, Total 11/07/2023 0.8  0.0 - 1.2 mg/dL Final    ALT 11/07/2023 18  10 - 52 U/L Final    Patients treated with Sulfasalazine may generate falsely decreased results for ALT.    Hemoglobin A1C 11/07/2023 5.4  see below % Final    Estimated Average Glucose 11/07/2023 108  Not Established mg/dL Final    Cholesterol 11/07/2023 112  0 - 199 mg/dL Final          Age      Desirable   Borderline High   High     0-19 Y     0 - 169       170 - 199     >/= 200    20-24 Y     0 - 189       190 - 224     >/= 225         >24 Y     0 - 199       200 - 239     >/= 240   **All ranges are based on fasting samples. Specific   therapeutic targets will vary based on patient-specific   cardiac risk.    Pediatric guidelines reference:Pediatrics 2011, 128(S5).Adult guidelines reference: NCEP ATPIII Guidelines,IBRAHIMA 2001, 258:2486-97    Venipuncture immediately after or during the administration of  Metamizole may lead to falsely low results. Testing should be performed immediately prior to Metamizole dosing.    HDL-Cholesterol 11/07/2023 35.4  mg/dL Final      Age       Very Low   Low     Normal    High    0-19 Y    < 35      < 40     40-45     ----  20-24 Y    ----     < 40      >45      ----        >24 Y      ----     < 40     40-60      >60      Cholesterol/HDL Ratio 11/07/2023 3.2   Final      Ref Values  Desirable  < 3.4  High Risk  > 5.0    LDL Calculated 11/07/2023 54  <=99 mg/dL Final                                Near   Borderline      AGE      Desirable  Optimal    High     High     Very High     0-19 Y     0 - 109     ---    110-129   >/= 130     ----    20-24 Y     0 - 119     ---    120-159   >/= 160     ----      >24 Y     0 -  99   100-129  130-159   160-189     >/=190      VLDL 11/07/2023 22  0 - 40 mg/dL Final    Triglycerides 11/07/2023 112  0 - 149 mg/dL Final       Age         Desirable   Borderline High   High     Very High   0 D-90 D    19 - 174         ----         ----        ----  91 D- 9 Y     0 -  74        75 -  99     >/= 100      ----    10-19 Y     0 -  89        90 - 129     >/= 130      ----    20-24 Y     0 - 114       115 - 149     >/= 150      ----         >24 Y     0 - 149       150 - 199    200- 499    >/= 500    Venipuncture immediately after or during the administration of Metamizole may lead to falsely low results. Testing should be performed immediately prior to Metamizole dosing.    Non HDL Cholesterol 11/07/2023 77  0 - 149 mg/dL Final          Age       Desirable   Borderline High   High     Very High     0-19 Y     0 - 119       120 - 144     >/= 145    >/= 160    20-24 Y     0 - 149       150 - 189     >/= 190      ----         >24 Y    30 mg/dL above LDL Cholesterol goal      Thyroid Stimulating Hormone 11/07/2023 4.43 (H)  0.44 - 3.98 mIU/L Final    Thyroxine, Free 11/07/2023 0.95  0.61 - 1.12 ng/dL Final    Prostate Specific Antigen,Screen 11/07/2023 <0.01   <=4.00 ng/mL Final        Assessment/Plan     Problem List Items Addressed This Visit       CKD stage G3a/A2, 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 and albumin creatinine ratio  mg/g (CMS/HCC)    Hyperglycemia    Malignant neoplasm of prostate (CMS/HCC)    Thyroid disorder    Relevant Medications    levothyroxine (Synthroid, Levoxyl) 50 mcg tablet    Essential hypertension    Relevant Medications    amLODIPine (Norvasc) 5 mg tablet    hydrALAZINE (Apresoline) 50 mg tablet    hydroCHLOROthiazide (HYDRODiuril) 25 mg tablet    losartan (Cozaar) 100 mg tablet    metoprolol succinate XL (Toprol-XL) 100 mg 24 hr tablet    HLD (hyperlipidemia)    Relevant Medications    atorvastatin (Lipitor) 20 mg tablet     Other Visit Diagnoses       Need for vaccination        Relevant Orders    Flu vaccine, quadrivalent, high-dose, preservative free, age 65y+ (FLUZONE)    Moderna COVID-19 vaccine, 1041-8639, monovalent, age 12 years and older, (50mcg/0.5mL)            Appt 4-6mo  See nephrology as planned  Guarded prognosis overall but labs other than kidneys stable  The patient is aware that results will be forthcoming of ALL planned labs and or tests. If no results are received on my chart or by letter within 1 - 3 weeks, the patient is aware they need to call to obtain results, as this is not usual. Also, if any new conditions arise, or current condition worsens, it is understood that sooner appointment should be made or urgent care/convenient care or emergency room treatment should be sought depending on severity. Otherwise follow up for recheck at regular intervals as we have discussed, at least yearly.    Advised rsv  Increase fluids  Declines cardio follow up unless sxs

## 2023-12-13 ENCOUNTER — APPOINTMENT (OUTPATIENT)
Dept: OTOLARYNGOLOGY | Facility: CLINIC | Age: 88
End: 2023-12-13
Payer: MEDICARE

## 2024-03-20 ENCOUNTER — OFFICE VISIT (OUTPATIENT)
Dept: OTOLARYNGOLOGY | Facility: CLINIC | Age: 89
End: 2024-03-20
Payer: MEDICARE

## 2024-03-20 VITALS
SYSTOLIC BLOOD PRESSURE: 135 MMHG | HEIGHT: 70 IN | WEIGHT: 262 LBS | BODY MASS INDEX: 37.51 KG/M2 | DIASTOLIC BLOOD PRESSURE: 74 MMHG | HEART RATE: 54 BPM | TEMPERATURE: 96.7 F

## 2024-03-20 DIAGNOSIS — H93.8X3 SENSATION OF PLUGGED EAR ON BOTH SIDES: Primary | ICD-10-CM

## 2024-03-20 DIAGNOSIS — L29.9 EAR ITCHING: ICD-10-CM

## 2024-03-20 DIAGNOSIS — H61.23 BILATERAL IMPACTED CERUMEN: ICD-10-CM

## 2024-03-20 PROCEDURE — 1036F TOBACCO NON-USER: CPT | Performed by: NURSE PRACTITIONER

## 2024-03-20 PROCEDURE — 1159F MED LIST DOCD IN RCRD: CPT | Performed by: NURSE PRACTITIONER

## 2024-03-20 PROCEDURE — 69210 REMOVE IMPACTED EAR WAX UNI: CPT | Performed by: NURSE PRACTITIONER

## 2024-03-20 PROCEDURE — 3075F SYST BP GE 130 - 139MM HG: CPT | Performed by: NURSE PRACTITIONER

## 2024-03-20 PROCEDURE — 99212 OFFICE O/P EST SF 10 MIN: CPT | Performed by: NURSE PRACTITIONER

## 2024-03-20 PROCEDURE — 1157F ADVNC CARE PLAN IN RCRD: CPT | Performed by: NURSE PRACTITIONER

## 2024-03-20 PROCEDURE — 1126F AMNT PAIN NOTED NONE PRSNT: CPT | Performed by: NURSE PRACTITIONER

## 2024-03-20 PROCEDURE — 69210 REMOVE IMPACTED EAR WAX UNI: CPT | Mod: 50 | Performed by: NURSE PRACTITIONER

## 2024-03-20 PROCEDURE — 3078F DIAST BP <80 MM HG: CPT | Performed by: NURSE PRACTITIONER

## 2024-03-20 SDOH — ECONOMIC STABILITY: FOOD INSECURITY: WITHIN THE PAST 12 MONTHS, THE FOOD YOU BOUGHT JUST DIDN'T LAST AND YOU DIDN'T HAVE MONEY TO GET MORE.: NEVER TRUE

## 2024-03-20 SDOH — ECONOMIC STABILITY: FOOD INSECURITY: WITHIN THE PAST 12 MONTHS, YOU WORRIED THAT YOUR FOOD WOULD RUN OUT BEFORE YOU GOT MONEY TO BUY MORE.: NEVER TRUE

## 2024-03-20 ASSESSMENT — COLUMBIA-SUICIDE SEVERITY RATING SCALE - C-SSRS
2. HAVE YOU ACTUALLY HAD ANY THOUGHTS OF KILLING YOURSELF?: NO
1. IN THE PAST MONTH, HAVE YOU WISHED YOU WERE DEAD OR WISHED YOU COULD GO TO SLEEP AND NOT WAKE UP?: NO
6. HAVE YOU EVER DONE ANYTHING, STARTED TO DO ANYTHING, OR PREPARED TO DO ANYTHING TO END YOUR LIFE?: NO

## 2024-03-20 ASSESSMENT — LIFESTYLE VARIABLES
AUDIT-C TOTAL SCORE: 3
HOW OFTEN DO YOU HAVE A DRINK CONTAINING ALCOHOL: 2-3 TIMES A WEEK
SKIP TO QUESTIONS 9-10: 1
HOW OFTEN DO YOU HAVE SIX OR MORE DRINKS ON ONE OCCASION: NEVER
HOW MANY STANDARD DRINKS CONTAINING ALCOHOL DO YOU HAVE ON A TYPICAL DAY: 1 OR 2

## 2024-03-20 ASSESSMENT — ENCOUNTER SYMPTOMS
DEPRESSION: 0
OCCASIONAL FEELINGS OF UNSTEADINESS: 0
LOSS OF SENSATION IN FEET: 0

## 2024-03-20 ASSESSMENT — PATIENT HEALTH QUESTIONNAIRE - PHQ9
SUM OF ALL RESPONSES TO PHQ9 QUESTIONS 1 AND 2: 0
1. LITTLE INTEREST OR PLEASURE IN DOING THINGS: NOT AT ALL
2. FEELING DOWN, DEPRESSED OR HOPELESS: NOT AT ALL

## 2024-03-20 ASSESSMENT — PAIN SCALES - GENERAL: PAINLEVEL: 0-NO PAIN

## 2024-03-20 NOTE — PROGRESS NOTES
Subjective   Patient ID: Jl Brand is a 88 y.o. male who presents for Cerumen Impaction.    HPI  Patient here for routine ear cleaning. Last cleaning was 7/12/2023. Ears are feeling full. The right is itching, used sweet oil which helped.     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 and albumin creatinine ratio  mg/g (CMS/[facility])    Gout    Hyperglycemia    Low vitamin D level    Malignant neoplasm of prostate (CMS/[facility])    Otomycosis    Overactive bladder    Thyroid disorder    Obesity, morbid (CMS/[facility])    Obesity (BMI 30-39.9)    Primary osteoarthritis of left knee    Primary osteoarthritis of right knee    Other specified glaucoma    Essential hypertension    HLD (hyperlipidemia)     Past Surgical History:   Procedure Laterality Date    CATARACT EXTRACTION  2015    COLONOSCOPY  2017    TOTAL KNEE ARTHROPLASTY Right 2021     Review of Systems    All other systems have been reviewed and are negative for complaints except for those mentioned in history of present illness, past medical history and problem list.    Objective   Physical Exam    Right Ear: External inspection of ear with no deformity, scars, or masses. EAC is impacted with cerumen, TM not visible.     Left Ear: External inspection of ear with no deformity, scars, or masses. EAC is impacted with cerumen, TM not visible.     Ear cerumen removal    Date/Time: 3/20/2024 2:24 PM    Performed by: LINCOLN Middleton  Authorized by: LINCOLN Middleton    Consent:     Consent obtained:  Verbal  Procedure details:     Location:  L ear and R ear    Procedure outcomes: cerumen removed    Post-procedure details:     Inspection:  No bleeding, ear canal clear and TM intact    Hearing quality:  Improved    Procedure completion:  Tolerated  Comments:      Bilateral canals with cerumen impaction.  Using the microscope, suction, and alligator forceps, large amounts of soft brown cerumen removed bilaterally. Both TMs  intact. No effusions or retractions noted.  Patient tolerated procedure well.       Assessment/Plan   Diagnoses and all orders for this visit:  Sensation of plugged ear on both sides  Bilateral impacted cerumen  Ear itching  Ears were successfully cleaned. Use sweet oil as needed to help with itching. Follow up in 6 months for repeat ear cleaning.  All questions answered to patient satisfaction.          DELFINO Middleton-CNP 03/20/24 2:10 PM

## 2024-03-26 ENCOUNTER — TELEPHONE (OUTPATIENT)
Dept: PRIMARY CARE | Facility: CLINIC | Age: 89
End: 2024-03-26
Payer: MEDICARE

## 2024-03-26 DIAGNOSIS — I10 ESSENTIAL HYPERTENSION: ICD-10-CM

## 2024-03-26 RX ORDER — AMLODIPINE BESYLATE 5 MG/1
5 TABLET ORAL 2 TIMES DAILY
Qty: 180 TABLET | Refills: 0 | Status: SHIPPED | OUTPATIENT
Start: 2024-03-26

## 2024-05-08 ENCOUNTER — LAB (OUTPATIENT)
Dept: LAB | Facility: LAB | Age: 89
End: 2024-05-08
Payer: MEDICARE

## 2024-05-08 DIAGNOSIS — R73.9 HYPERGLYCEMIA: ICD-10-CM

## 2024-05-08 DIAGNOSIS — N18.31 CKD STAGE G3A/A2, GFR 45-59 AND ALBUMIN CREATININE RATIO 30-299 MG/G (MULTI): ICD-10-CM

## 2024-05-08 DIAGNOSIS — E78.5 HYPERLIPIDEMIA, UNSPECIFIED HYPERLIPIDEMIA TYPE: ICD-10-CM

## 2024-05-08 DIAGNOSIS — I10 ESSENTIAL HYPERTENSION: ICD-10-CM

## 2024-05-08 DIAGNOSIS — E78.2 MIXED HYPERLIPIDEMIA: ICD-10-CM

## 2024-05-08 DIAGNOSIS — E07.9 THYROID DISORDER: ICD-10-CM

## 2024-05-08 LAB
ALBUMIN SERPL BCP-MCNC: 4.4 G/DL (ref 3.4–5)
ALP SERPL-CCNC: 86 U/L (ref 33–136)
ALT SERPL W P-5'-P-CCNC: 16 U/L (ref 10–52)
ANION GAP SERPL CALC-SCNC: 13 MMOL/L (ref 10–20)
AST SERPL W P-5'-P-CCNC: 14 U/L (ref 9–39)
BILIRUB SERPL-MCNC: 0.8 MG/DL (ref 0–1.2)
BUN SERPL-MCNC: 36 MG/DL (ref 6–23)
CALCIUM SERPL-MCNC: 10.6 MG/DL (ref 8.6–10.3)
CHLORIDE SERPL-SCNC: 105 MMOL/L (ref 98–107)
CHOLEST SERPL-MCNC: 117 MG/DL (ref 0–199)
CHOLESTEROL/HDL RATIO: 3.3
CO2 SERPL-SCNC: 27 MMOL/L (ref 21–32)
CREAT SERPL-MCNC: 1.5 MG/DL (ref 0.5–1.3)
EGFRCR SERPLBLD CKD-EPI 2021: 45 ML/MIN/1.73M*2
ERYTHROCYTE [DISTWIDTH] IN BLOOD BY AUTOMATED COUNT: 14 % (ref 11.5–14.5)
EST. AVERAGE GLUCOSE BLD GHB EST-MCNC: 114 MG/DL
GLUCOSE SERPL-MCNC: 104 MG/DL (ref 74–99)
HBA1C MFR BLD: 5.6 %
HCT VFR BLD AUTO: 43.7 % (ref 41–52)
HDLC SERPL-MCNC: 35.6 MG/DL
HGB BLD-MCNC: 14.8 G/DL (ref 13.5–17.5)
LDLC SERPL CALC-MCNC: 53 MG/DL
MCH RBC QN AUTO: 30.5 PG (ref 26–34)
MCHC RBC AUTO-ENTMCNC: 33.9 G/DL (ref 32–36)
MCV RBC AUTO: 90 FL (ref 80–100)
NON HDL CHOLESTEROL: 81 MG/DL (ref 0–149)
NRBC BLD-RTO: 0 /100 WBCS (ref 0–0)
PLATELET # BLD AUTO: 227 X10*3/UL (ref 150–450)
POTASSIUM SERPL-SCNC: 3.9 MMOL/L (ref 3.5–5.3)
PROT SERPL-MCNC: 7.7 G/DL (ref 6.4–8.2)
RBC # BLD AUTO: 4.85 X10*6/UL (ref 4.5–5.9)
SODIUM SERPL-SCNC: 141 MMOL/L (ref 136–145)
T4 FREE SERPL-MCNC: 0.95 NG/DL (ref 0.61–1.12)
TRIGL SERPL-MCNC: 143 MG/DL (ref 0–149)
TSH SERPL-ACNC: 2.81 MIU/L (ref 0.44–3.98)
VLDL: 29 MG/DL (ref 0–40)
WBC # BLD AUTO: 7.8 X10*3/UL (ref 4.4–11.3)

## 2024-05-08 PROCEDURE — 80053 COMPREHEN METABOLIC PANEL: CPT

## 2024-05-08 PROCEDURE — 84439 ASSAY OF FREE THYROXINE: CPT

## 2024-05-08 PROCEDURE — 36415 COLL VENOUS BLD VENIPUNCTURE: CPT

## 2024-05-08 PROCEDURE — 84443 ASSAY THYROID STIM HORMONE: CPT

## 2024-05-08 PROCEDURE — 83036 HEMOGLOBIN GLYCOSYLATED A1C: CPT

## 2024-05-08 PROCEDURE — 85027 COMPLETE CBC AUTOMATED: CPT

## 2024-05-08 PROCEDURE — 80061 LIPID PANEL: CPT

## 2024-05-15 ENCOUNTER — OFFICE VISIT (OUTPATIENT)
Dept: PRIMARY CARE | Facility: CLINIC | Age: 89
End: 2024-05-15
Payer: MEDICARE

## 2024-05-15 VITALS
RESPIRATION RATE: 16 BRPM | BODY MASS INDEX: 36.94 KG/M2 | HEIGHT: 70 IN | OXYGEN SATURATION: 95 % | HEART RATE: 45 BPM | WEIGHT: 258 LBS | DIASTOLIC BLOOD PRESSURE: 68 MMHG | SYSTOLIC BLOOD PRESSURE: 126 MMHG | TEMPERATURE: 97.2 F

## 2024-05-15 DIAGNOSIS — C61 MALIGNANT NEOPLASM OF PROSTATE (MULTI): ICD-10-CM

## 2024-05-15 DIAGNOSIS — N18.32 CHRONIC KIDNEY DISEASE, STAGE 3B (MULTI): ICD-10-CM

## 2024-05-15 DIAGNOSIS — N18.31 CKD STAGE G3A/A2, GFR 45-59 AND ALBUMIN CREATININE RATIO 30-299 MG/G (MULTI): ICD-10-CM

## 2024-05-15 DIAGNOSIS — R01.1 HEART MURMUR: ICD-10-CM

## 2024-05-15 DIAGNOSIS — E78.5 HYPERLIPIDEMIA, UNSPECIFIED HYPERLIPIDEMIA TYPE: ICD-10-CM

## 2024-05-15 DIAGNOSIS — E07.9 THYROID DISORDER: ICD-10-CM

## 2024-05-15 DIAGNOSIS — I10 ESSENTIAL HYPERTENSION: ICD-10-CM

## 2024-05-15 DIAGNOSIS — R73.9 HYPERGLYCEMIA: ICD-10-CM

## 2024-05-15 DIAGNOSIS — E66.01 OBESITY, MORBID (MULTI): ICD-10-CM

## 2024-05-15 DIAGNOSIS — R00.1 BRADYCARDIA: Primary | ICD-10-CM

## 2024-05-15 PROCEDURE — 1036F TOBACCO NON-USER: CPT | Performed by: FAMILY MEDICINE

## 2024-05-15 PROCEDURE — 3078F DIAST BP <80 MM HG: CPT | Performed by: FAMILY MEDICINE

## 2024-05-15 PROCEDURE — 1157F ADVNC CARE PLAN IN RCRD: CPT | Performed by: FAMILY MEDICINE

## 2024-05-15 PROCEDURE — 1159F MED LIST DOCD IN RCRD: CPT | Performed by: FAMILY MEDICINE

## 2024-05-15 PROCEDURE — 93000 ELECTROCARDIOGRAM COMPLETE: CPT | Performed by: FAMILY MEDICINE

## 2024-05-15 PROCEDURE — 3074F SYST BP LT 130 MM HG: CPT | Performed by: FAMILY MEDICINE

## 2024-05-15 PROCEDURE — 99214 OFFICE O/P EST MOD 30 MIN: CPT | Performed by: FAMILY MEDICINE

## 2024-05-15 RX ORDER — TIMOLOL MALEATE 2.5 MG/ML
SOLUTION/ DROPS OPHTHALMIC
COMMUNITY
Start: 2024-05-13

## 2024-05-15 NOTE — PROGRESS NOTES
Jl Ortegakaterina 88 y.o. male presents today for a physical -check up  And   Chief Complaint   Patient presents with    Hyperglycemia    Hypertension    Chronic Kidney Disease    Hyperlipidemia       Covid vax: UTD  Flu: UTD  Pneumo: UTD  RSV: UTD  Shingles: UTD     CRC: 2017  Hba1c 5.6  Ldl 53  Cr 1.5  Patient Active Problem List   Diagnosis    Chronic kidney disease, stage 3b (Multi)    Gout    Hyperglycemia    Low vitamin D level    Malignant neoplasm of prostate (Multi)    Otomycosis    Overactive bladder    Thyroid disorder    Obesity, morbid (Multi)    Obesity (BMI 30-39.9)    Primary osteoarthritis of left knee    Primary osteoarthritis of right knee    Other specified glaucoma    Essential hypertension    HLD (hyperlipidemia)      Past Surgical History:   Procedure Laterality Date    CATARACT EXTRACTION  2015    COLONOSCOPY  2017    TOTAL KNEE ARTHROPLASTY Right 2021      Allergies   Allergen Reactions    Celecoxib Diarrhea    Penicillins Unknown    Sulfa (Sulfonamide Antibiotics) Diarrhea      Current Outpatient Medications   Medication Sig Dispense Refill    amLODIPine (Norvasc) 5 mg tablet Take 1 tablet (5 mg) by mouth 2 times a day. 180 tablet 0    aspirin 81 mg EC tablet Take 1 tablet (81 mg) by mouth once daily.      atorvastatin (Lipitor) 20 mg tablet Take 1 tablet (20 mg) by mouth once daily. 90 tablet 3    cholecalciferol (Vitamin D-3) 50 mcg (2,000 unit) capsule Take by mouth.      flaxseed oiL 1,000 mg capsule Take 2 capsules (2,000 mg) by mouth.      hydrALAZINE (Apresoline) 50 mg tablet Take 1 tablet (50 mg) by mouth 3 times a day. 270 tablet 3    hydroCHLOROthiazide (HYDRODiuril) 25 mg tablet Take 1 tablet (25 mg) by mouth once daily in the morning. Take before meals. 90 tablet 3    levothyroxine (Synthroid, Levoxyl) 50 mcg tablet Take 1 tablet (50 mcg) by mouth once daily in the morning. Take before meals. take on an empty stomach 90 tablet 3    losartan (Cozaar) 100 mg tablet Take 1 tablet (100  mg) by mouth once daily. 90 tablet 3    magnesium oxide 500 mg capsule Take by mouth.      metoprolol succinate XL (Toprol-XL) 100 mg 24 hr tablet Take 1 tablet (100 mg) by mouth once daily. 90 tablet 3    multivitamin tablet Take by mouth.      niacin 500 mg tablet Take 2 tablets (1,000 mg) by mouth.      timolol (Timoptic) 0.25 % ophthalmic solution        No current facility-administered medications for this visit.      Social History     Tobacco Use    Smoking status: Never    Smokeless tobacco: Never   Substance Use Topics    Alcohol use: Yes     Alcohol/week: 4.0 standard drinks of alcohol     Types: 4 Cans of beer per week        The patient reports  No Fever/chills  No Chest pain nor shortness of breath  No Nausea/vomiting/diarrhea  No CVA tenderness  No Confusion   No Paresthesias nor headache  No Urinary issues-frequency or dysuria nor gross hematuria  No new issues with anxiety nor depression, no suicidal nor homicidal ideations  Patient is aware of the controversy over PSA and USPSTF recommendations regarding screening options. He is aware of limitations of this test and will follow with urology if abnormality found.  He is also aware of current recommendation re: colon cancer screening starting at age 45 until 75.  If a smoker-he is aware of AAA screening guidelines AND CT lung screening recommendations and necessary follow up.     Objective:  Vitals:    05/15/24 1122   BP: 126/68   Pulse: (!) 45   Resp: 16   Temp: 36.2 °C (97.2 °F)   SpO2: 95%     General:  alert, oriented, no acute distress.  No obvious skin rashes noted.   No gait disturbance noted.    Mood is pleasant, not tearful, no signs of emotional distress.  Not appearing intoxicated or altered.   No voiced delusions,   Normal, appropriate behavior.    HEENT: Normocephalic, atraumatic,   Pupils round, reactive to light  Extraocular motions intact and wnl  Tympanic membranes normal    Neck: no nuchal rigidity  No masses palpable.  No carotid  bruits.  No thyromegaly.    Respiratory: Equal breath sounds  No wheezes,    rales,    nor rhonchi  No respiratory distress.    Heart: Regular rate and rhythm, no    murmurs  no rubs/gallops    Abdomen: no masses palpable, no rebound nor guarding, no rebound nor guarding.overwt    Extremities: NO cyanosis noted, no clubbing.   No edema noted.  2+dorsalis pedis pulses.    Normal-not antalgic, steady gait.  H of h      Assessment/Plan    Problem List Items Addressed This Visit       Chronic kidney disease, stage 3b (Multi)    Hyperglycemia    Malignant neoplasm of prostate (Multi)    Thyroid disorder    Obesity, morbid (Multi)    Essential hypertension    HLD (hyperlipidemia)       Patient to follow up at next regularly scheduled visit, health maintenance exams at least yearly-sooner if condition deteriorates or problems arise.    The patient is aware that results of testing should be back in a timely manner-for labs it is usually a few days and no longer than a few weeks. If results are not received (by Mychart, mail, or phone call) patient is aware of the next steps and agrees to call office for results until obtained. If results are not understood by patient, they are aware they can make a virtual or in person visit to discuss.  Declines cardio for now  For adan will cut toprol in 1/2 7/2024 apt to check bp and pulse on 1/2 toprol    Helen Hooper MD

## 2024-05-17 ENCOUNTER — CLINICAL SUPPORT (OUTPATIENT)
Dept: AUDIOLOGY | Facility: CLINIC | Age: 89
End: 2024-05-17
Payer: MEDICARE

## 2024-05-17 DIAGNOSIS — H90.3 SENSORINEURAL HEARING LOSS (SNHL), BILATERAL: Primary | ICD-10-CM

## 2024-05-17 PROCEDURE — V5299 HEARING SERVICE: HCPCS | Performed by: AUDIOLOGIST

## 2024-05-17 NOTE — PROGRESS NOTES
Hearing aid check    Phonak Audeo P50-R (bilateral)  Size 3M right and left receivers  Left earmold (SN [id number])  Right small power dome (changed from small vented dome 5/17/2024 due to patient feeling right aid is weaker than left and  recommendation for power dome)    - Patient and daughter report that patient has not been hearing as well with his hearing aids, particularly the left    - left aid weak    - right aid incoming with small vented dome; programming indicates power dome.        -  changed to right small power dome. Changed Cerushield (not occluded)  - cleaned left mold and changed internal and external wax protectors  - increased gain for right aid for symmetry     Rec.:  - Hearing test in August after ears are cleaned (or sooner if there is a concern for a change in hearing; ears should be cleaned prior to audiometry).    - Hearing Aid Check as needed.

## 2024-06-12 ENCOUNTER — LAB (OUTPATIENT)
Dept: LAB | Facility: LAB | Age: 89
End: 2024-06-12
Payer: MEDICARE

## 2024-06-12 DIAGNOSIS — N18.32 CHRONIC KIDNEY DISEASE, STAGE 3B (MULTI): Primary | ICD-10-CM

## 2024-06-12 DIAGNOSIS — E83.52 HYPERCALCEMIA: ICD-10-CM

## 2024-06-12 LAB
25(OH)D3 SERPL-MCNC: 38 NG/ML (ref 30–100)
ALBUMIN SERPL BCP-MCNC: 4.2 G/DL (ref 3.4–5)
ANION GAP SERPL CALC-SCNC: 12 MMOL/L (ref 10–20)
APPEARANCE UR: CLEAR
BILIRUB UR STRIP.AUTO-MCNC: NEGATIVE MG/DL
BUN SERPL-MCNC: 33 MG/DL (ref 6–23)
CALCIUM SERPL-MCNC: 10.2 MG/DL (ref 8.6–10.3)
CHLORIDE SERPL-SCNC: 103 MMOL/L (ref 98–107)
CO2 SERPL-SCNC: 29 MMOL/L (ref 21–32)
COLOR UR: ABNORMAL
CREAT SERPL-MCNC: 1.66 MG/DL (ref 0.5–1.3)
CREAT UR-MCNC: 102.9 MG/DL (ref 20–370)
EGFRCR SERPLBLD CKD-EPI 2021: 40 ML/MIN/1.73M*2
ERYTHROCYTE [DISTWIDTH] IN BLOOD BY AUTOMATED COUNT: 13.9 % (ref 11.5–14.5)
GLUCOSE SERPL-MCNC: 97 MG/DL (ref 74–99)
GLUCOSE UR STRIP.AUTO-MCNC: NORMAL MG/DL
HCT VFR BLD AUTO: 42.6 % (ref 41–52)
HGB BLD-MCNC: 13.8 G/DL (ref 13.5–17.5)
KETONES UR STRIP.AUTO-MCNC: NEGATIVE MG/DL
LEUKOCYTE ESTERASE UR QL STRIP.AUTO: NEGATIVE
MCH RBC QN AUTO: 29.6 PG (ref 26–34)
MCHC RBC AUTO-ENTMCNC: 32.4 G/DL (ref 32–36)
MCV RBC AUTO: 91 FL (ref 80–100)
MUCOUS THREADS #/AREA URNS AUTO: NORMAL /LPF
NITRITE UR QL STRIP.AUTO: NEGATIVE
NRBC BLD-RTO: 0 /100 WBCS (ref 0–0)
PH UR STRIP.AUTO: 6.5 [PH]
PHOSPHATE SERPL-MCNC: 4 MG/DL (ref 2.5–4.9)
PLATELET # BLD AUTO: 240 X10*3/UL (ref 150–450)
POTASSIUM SERPL-SCNC: 3.9 MMOL/L (ref 3.5–5.3)
PROT UR STRIP.AUTO-MCNC: ABNORMAL MG/DL
PROT UR-ACNC: 128 MG/DL (ref 5–25)
PROT/CREAT UR: 1.24 MG/MG CREAT (ref 0–0.17)
RBC # BLD AUTO: 4.67 X10*6/UL (ref 4.5–5.9)
RBC # UR STRIP.AUTO: NEGATIVE /UL
RBC #/AREA URNS AUTO: NORMAL /HPF
SODIUM SERPL-SCNC: 140 MMOL/L (ref 136–145)
SP GR UR STRIP.AUTO: 1.02
UROBILINOGEN UR STRIP.AUTO-MCNC: NORMAL MG/DL
WBC # BLD AUTO: 7.3 X10*3/UL (ref 4.4–11.3)
WBC #/AREA URNS AUTO: NORMAL /HPF

## 2024-06-12 PROCEDURE — 83970 ASSAY OF PARATHORMONE: CPT

## 2024-06-12 PROCEDURE — 82570 ASSAY OF URINE CREATININE: CPT

## 2024-06-12 PROCEDURE — 85027 COMPLETE CBC AUTOMATED: CPT

## 2024-06-12 PROCEDURE — 36415 COLL VENOUS BLD VENIPUNCTURE: CPT

## 2024-06-12 PROCEDURE — 80069 RENAL FUNCTION PANEL: CPT

## 2024-06-12 PROCEDURE — 82306 VITAMIN D 25 HYDROXY: CPT

## 2024-06-12 PROCEDURE — 84156 ASSAY OF PROTEIN URINE: CPT

## 2024-06-12 PROCEDURE — 81001 URINALYSIS AUTO W/SCOPE: CPT

## 2024-06-13 LAB — PTH-INTACT SERPL-MCNC: 93.1 PG/ML (ref 18.5–88)

## 2024-06-25 ENCOUNTER — ANCILLARY PROCEDURE (OUTPATIENT)
Dept: CARDIOLOGY | Facility: HOSPITAL | Age: 89
End: 2024-06-25
Payer: MEDICARE

## 2024-06-25 DIAGNOSIS — R01.1 HEART MURMUR: ICD-10-CM

## 2024-06-25 DIAGNOSIS — R00.1 BRADYCARDIA: ICD-10-CM

## 2024-06-25 PROCEDURE — 93306 TTE W/DOPPLER COMPLETE: CPT | Performed by: INTERNAL MEDICINE

## 2024-06-25 PROCEDURE — 93306 TTE W/DOPPLER COMPLETE: CPT

## 2024-06-28 LAB
AORTIC VALVE MEAN GRADIENT: 8 MMHG
AORTIC VALVE PEAK VELOCITY: 1.95 M/S
AV PEAK GRADIENT: 15.2 MMHG
AVA (PEAK VEL): 2.66 CM2
AVA (VTI): 2.71 CM2
EJECTION FRACTION APICAL 4 CHAMBER: 64.6
EJECTION FRACTION: 65 %
LEFT VENTRICLE INTERNAL DIMENSION DIASTOLE: 5.08 CM (ref 3.5–6)
LEFT VENTRICULAR OUTFLOW TRACT DIAMETER: 2.3 CM
LV EJECTION FRACTION BIPLANE: 60 %
MITRAL VALVE E/A RATIO: 0.73
RIGHT VENTRICLE PEAK SYSTOLIC PRESSURE: 26.7 MMHG

## 2024-07-10 ENCOUNTER — LAB (OUTPATIENT)
Dept: LAB | Facility: LAB | Age: 89
End: 2024-07-10
Payer: MEDICARE

## 2024-07-10 DIAGNOSIS — R73.9 HYPERGLYCEMIA: ICD-10-CM

## 2024-07-10 DIAGNOSIS — E78.2 MIXED HYPERLIPIDEMIA: ICD-10-CM

## 2024-07-10 DIAGNOSIS — N18.32 CHRONIC KIDNEY DISEASE, STAGE 3B (MULTI): ICD-10-CM

## 2024-07-10 LAB
ALBUMIN SERPL BCP-MCNC: 3.8 G/DL (ref 3.4–5)
ALP SERPL-CCNC: 70 U/L (ref 33–136)
ALT SERPL W P-5'-P-CCNC: 15 U/L (ref 10–52)
ANION GAP SERPL CALC-SCNC: 11 MMOL/L (ref 10–20)
AST SERPL W P-5'-P-CCNC: 12 U/L (ref 9–39)
BILIRUB SERPL-MCNC: 0.6 MG/DL (ref 0–1.2)
BUN SERPL-MCNC: 35 MG/DL (ref 6–23)
CALCIUM SERPL-MCNC: 10.2 MG/DL (ref 8.6–10.3)
CHLORIDE SERPL-SCNC: 105 MMOL/L (ref 98–107)
CHOLEST SERPL-MCNC: 112 MG/DL (ref 0–199)
CHOLESTEROL/HDL RATIO: 2.9
CO2 SERPL-SCNC: 30 MMOL/L (ref 21–32)
CREAT SERPL-MCNC: 1.54 MG/DL (ref 0.5–1.3)
EGFRCR SERPLBLD CKD-EPI 2021: 43 ML/MIN/1.73M*2
ERYTHROCYTE [DISTWIDTH] IN BLOOD BY AUTOMATED COUNT: 14.6 % (ref 11.5–14.5)
EST. AVERAGE GLUCOSE BLD GHB EST-MCNC: 111 MG/DL
GLUCOSE SERPL-MCNC: 101 MG/DL (ref 74–99)
HBA1C MFR BLD: 5.5 %
HCT VFR BLD AUTO: 41 % (ref 41–52)
HDLC SERPL-MCNC: 38.7 MG/DL
HGB BLD-MCNC: 13.1 G/DL (ref 13.5–17.5)
LDLC SERPL CALC-MCNC: 50 MG/DL
MCH RBC QN AUTO: 29.8 PG (ref 26–34)
MCHC RBC AUTO-ENTMCNC: 32 G/DL (ref 32–36)
MCV RBC AUTO: 93 FL (ref 80–100)
NON HDL CHOLESTEROL: 73 MG/DL (ref 0–149)
NRBC BLD-RTO: 0 /100 WBCS (ref 0–0)
PLATELET # BLD AUTO: 215 X10*3/UL (ref 150–450)
POTASSIUM SERPL-SCNC: 3.8 MMOL/L (ref 3.5–5.3)
PROT SERPL-MCNC: 6.8 G/DL (ref 6.4–8.2)
RBC # BLD AUTO: 4.39 X10*6/UL (ref 4.5–5.9)
SODIUM SERPL-SCNC: 142 MMOL/L (ref 136–145)
TRIGL SERPL-MCNC: 117 MG/DL (ref 0–149)
VLDL: 23 MG/DL (ref 0–40)
WBC # BLD AUTO: 6.6 X10*3/UL (ref 4.4–11.3)

## 2024-07-10 PROCEDURE — 80061 LIPID PANEL: CPT

## 2024-07-10 PROCEDURE — 85027 COMPLETE CBC AUTOMATED: CPT

## 2024-07-10 PROCEDURE — 80053 COMPREHEN METABOLIC PANEL: CPT

## 2024-07-10 PROCEDURE — 83036 HEMOGLOBIN GLYCOSYLATED A1C: CPT

## 2024-07-10 PROCEDURE — 36415 COLL VENOUS BLD VENIPUNCTURE: CPT

## 2024-07-16 ENCOUNTER — APPOINTMENT (OUTPATIENT)
Dept: PRIMARY CARE | Facility: CLINIC | Age: 89
End: 2024-07-16
Payer: MEDICARE

## 2024-07-16 VITALS
SYSTOLIC BLOOD PRESSURE: 124 MMHG | RESPIRATION RATE: 18 BRPM | WEIGHT: 252 LBS | HEART RATE: 53 BPM | HEIGHT: 70 IN | TEMPERATURE: 97.1 F | OXYGEN SATURATION: 94 % | DIASTOLIC BLOOD PRESSURE: 64 MMHG | BODY MASS INDEX: 36.08 KG/M2

## 2024-07-16 DIAGNOSIS — Z13.89 MULTIPHASIC SCREENING: ICD-10-CM

## 2024-07-16 DIAGNOSIS — R00.1 BRADYCARDIA: ICD-10-CM

## 2024-07-16 DIAGNOSIS — C61 MALIGNANT NEOPLASM OF PROSTATE (MULTI): ICD-10-CM

## 2024-07-16 DIAGNOSIS — R73.9 HYPERGLYCEMIA: ICD-10-CM

## 2024-07-16 DIAGNOSIS — Z00.00 ROUTINE GENERAL MEDICAL EXAMINATION AT HEALTH CARE FACILITY: Primary | ICD-10-CM

## 2024-07-16 DIAGNOSIS — N18.32 CHRONIC KIDNEY DISEASE, STAGE 3B (MULTI): ICD-10-CM

## 2024-07-16 DIAGNOSIS — E78.5 HYPERLIPIDEMIA, UNSPECIFIED HYPERLIPIDEMIA TYPE: ICD-10-CM

## 2024-07-16 DIAGNOSIS — Z00.00 ENCOUNTER FOR MEDICARE ANNUAL WELLNESS EXAM: ICD-10-CM

## 2024-07-16 DIAGNOSIS — E07.9 THYROID DISORDER: ICD-10-CM

## 2024-07-16 DIAGNOSIS — Z71.89 EARLY INTERVENTION COUNSELING: ICD-10-CM

## 2024-07-16 DIAGNOSIS — I10 ESSENTIAL HYPERTENSION: ICD-10-CM

## 2024-07-16 PROCEDURE — 99213 OFFICE O/P EST LOW 20 MIN: CPT | Performed by: FAMILY MEDICINE

## 2024-07-16 PROCEDURE — 1036F TOBACCO NON-USER: CPT | Performed by: FAMILY MEDICINE

## 2024-07-16 PROCEDURE — 1160F RVW MEDS BY RX/DR IN RCRD: CPT | Performed by: FAMILY MEDICINE

## 2024-07-16 PROCEDURE — G0439 PPPS, SUBSEQ VISIT: HCPCS | Performed by: FAMILY MEDICINE

## 2024-07-16 PROCEDURE — 1159F MED LIST DOCD IN RCRD: CPT | Performed by: FAMILY MEDICINE

## 2024-07-16 PROCEDURE — 3078F DIAST BP <80 MM HG: CPT | Performed by: FAMILY MEDICINE

## 2024-07-16 PROCEDURE — G0444 DEPRESSION SCREEN ANNUAL: HCPCS | Performed by: FAMILY MEDICINE

## 2024-07-16 PROCEDURE — 3074F SYST BP LT 130 MM HG: CPT | Performed by: FAMILY MEDICINE

## 2024-07-16 PROCEDURE — 1157F ADVNC CARE PLAN IN RCRD: CPT | Performed by: FAMILY MEDICINE

## 2024-07-16 PROCEDURE — G0446 INTENS BEHAVE THER CARDIO DX: HCPCS | Performed by: FAMILY MEDICINE

## 2024-07-16 PROCEDURE — 1170F FXNL STATUS ASSESSED: CPT | Performed by: FAMILY MEDICINE

## 2024-07-16 RX ORDER — METOPROLOL SUCCINATE 50 MG/1
50 TABLET, EXTENDED RELEASE ORAL DAILY
Qty: 90 TABLET | Refills: 3 | Status: SHIPPED | OUTPATIENT
Start: 2024-07-16

## 2024-07-16 ASSESSMENT — ENCOUNTER SYMPTOMS
OCCASIONAL FEELINGS OF UNSTEADINESS: 1
DEPRESSION: 0
LOSS OF SENSATION IN FEET: 1

## 2024-07-16 ASSESSMENT — ACTIVITIES OF DAILY LIVING (ADL)
MANAGING_FINANCES: INDEPENDENT
DOING_HOUSEWORK: INDEPENDENT
TAKING_MEDICATION: INDEPENDENT
BATHING: INDEPENDENT
GROCERY_SHOPPING: INDEPENDENT
DRESSING: INDEPENDENT

## 2024-07-16 NOTE — PROGRESS NOTES
Subjective   Reason for Visit: Jl Brand is a 88 y.o. male here for a Medicare Wellness visit.   Covid vax: UTD  Flu: UTD  Pneumo: UTD  RSV: UTD  Shingles: UTD  CHECKLIST REVIEWED AND COMPLETE FOR [facility]  Cr 1.54  All other labs good-last time  Hba1c 5.5  SEES DR ORTIZ    Medicare Annual Wellness Visit Subsequent, Hypertension, Chronic Kidney Disease, Hyperlipidemia, and Results (Test results)  ---------------------------------------------------------------------------------------------  Past Medical, Surgical, and Family History reviewed and updated in chart.    Reviewed all medications by prescribing practitioner or clinical pharmacist (such as prescriptions, OTCs, herbal therapies and supplements) and documented in the medical record.    HPI    Patient Self Assessment of Health Status  Patient Self Assessment: Good    Nutrition and Exercise:    Current Diet: HEALTHY Diet always best, minimizing excess carbs,   weight reduction advised if BMI not WNL, please maintain a NORMAL BMI 18.5-24.9    Adequate Fluid Intake: Yes  Caffeine: -aware to minimize intake, <300mg best to even take in LESS  Exercise Frequency: Regularly advised, weight bearing, strengthening, aerobic    Functional Ability/Level of Safety:  Home safety addressed: no active new concerns,   fall risk addressed  NO new hearing issues or concerns  [place] in ADLs addressed and areas of assistance if present -noted  Still drives still inde[pendent    ANY Cognitive Impairment Observed: No cognitive impairment observed    Home Safety Risk Factors: None  --------------------------------------------------------------------------------------------  Patient Care Team:  Helen Hooper MD as PCP - General    HPI  Patient Active Problem List   Diagnosis    Chronic kidney disease, stage 3b (Multi)    Gout    Hyperglycemia    Low vitamin D level    Malignant neoplasm of prostate (Multi)    Otomycosis    Overactive bladder    Thyroid disorder     "Obesity, morbid (Multi)    Obesity (BMI 30-39.9)    Primary osteoarthritis of left knee    Primary osteoarthritis of right knee    Other specified glaucoma    Essential hypertension    HLD (hyperlipidemia)      Past Surgical History:   Procedure Laterality Date    CATARACT EXTRACTION  2015    COLONOSCOPY  2017    TOTAL KNEE ARTHROPLASTY Right 2021         PHQ2(-) No active depressed mood or not in crisis, 5min. spent in discussion.    Review of Systems:    NO Seizures  NO CAD  NO CVA    This patient has   NO history of recent Covid nor flu symptoms,  NO Fever nor chills,  NO Chest pain, shortness of breath nor paroxysmal nocturnal dyspnea,  NO Nausea, vomiting, nor diarrhea,  NO Hematochezia nor melena,  NO Dysuria, hematuria, nor new incontinence issues  NO new severe headaches nor neurological complaints,  NO new issues with anxiety nor depression nor new psychiatric complaints,  NO suicidal nor homicidal ideations.  ---------------------------------------------------------------------------------------------   OBJECTIVE:  /64 (BP Location: Right arm, Patient Position: Sitting, BP Cuff Size: Large adult)   Pulse 53   Temp 36.2 °C (97.1 °F) (Temporal)   Resp 18   Ht 1.778 m (5' 10\")   Wt 114 kg (252 lb)   SpO2 94%   BMI 36.16 kg/m²      General:  alert, oriented, no acute distress.  No obvious skin rashes noted.   No gait disturbance noted.    Mood is pleasant, not tearful, no signs of emotional distress.  Not appearing intoxicated or altered.   No voiced delusions,   Normal, appropriate behavior.    HEENT: Normocephalic, atraumatic,   Pupils round, reactive to light  Extraocular motions intact and wnl  Tympanic membranes normal    Neck: no nuchal rigidity  No masses palpable.  No carotid bruits.  No thyromegaly.    Respiratory: Equal breath sounds  No wheezes,    rales,    nor rhonchi  No respiratory distress.    Heart: Regular rate and rhythm, 2/6sys RSB    murmurs  no rubs/gallops    Abdomen: no " masses palpable, no rebound nor guarding, no rebound nor guarding. overwt    Extremities: NO cyanosis noted, no clubbing.   No edema noted.  2+dorsalis pedis pulses.    Normal-not antalgic, steady gait.    Lab on 07/10/2024   Component Date Value Ref Range Status    Glucose 07/10/2024 101 (H)  74 - 99 mg/dL Final    Sodium 07/10/2024 142  136 - 145 mmol/L Final    Potassium 07/10/2024 3.8  3.5 - 5.3 mmol/L Final    Chloride 07/10/2024 105  98 - 107 mmol/L Final    Bicarbonate 07/10/2024 30  21 - 32 mmol/L Final    Anion Gap 07/10/2024 11  10 - 20 mmol/L Final    Urea Nitrogen 07/10/2024 35 (H)  6 - 23 mg/dL Final    Creatinine 07/10/2024 1.54 (H)  0.50 - 1.30 mg/dL Final    eGFR 07/10/2024 43 (L)  >60 mL/min/1.73m*2 Final    Calculations of estimated GFR are performed using the 2021 CKD-EPI Study Refit equation without the race variable for the IDMS-Traceable creatinine methods.  https://jasn.asnjournals.org/content/early/2021/09/22/ASN.5021701080    Calcium 07/10/2024 10.2  8.6 - 10.3 mg/dL Final    Albumin 07/10/2024 3.8  3.4 - 5.0 g/dL Final    Alkaline Phosphatase 07/10/2024 70  33 - 136 U/L Final    Total Protein 07/10/2024 6.8  6.4 - 8.2 g/dL Final    AST 07/10/2024 12  9 - 39 U/L Final    Bilirubin, Total 07/10/2024 0.6  0.0 - 1.2 mg/dL Final    ALT 07/10/2024 15  10 - 52 U/L Final    Patients treated with Sulfasalazine may generate falsely decreased results for ALT.    Cholesterol 07/10/2024 112  0 - 199 mg/dL Final          Age      Desirable   Borderline High   High     0-19 Y     0 - 169       170 - 199     >/= 200    20-24 Y     0 - 189       190 - 224     >/= 225         >24 Y     0 - 199       200 - 239     >/= 240   **All ranges are based on fasting samples. Specific   therapeutic targets will vary based on patient-specific   cardiac risk.    Pediatric guidelines reference:Pediatrics 2011, 128(S5).Adult guidelines reference: NCEP ATPIII Guidelines,IBRAHIMA 2001, 258:2486-97    Venipuncture immediately  after or during the administration of Metamizole may lead to falsely low results. Testing should be performed immediately prior to Metamizole dosing.    HDL-Cholesterol 07/10/2024 38.7  mg/dL Final      Age       Very Low   Low     Normal    High    0-19 Y    < 35      < 40     40-45     ----  20-24 Y    ----     < 40      >45      ----        >24 Y      ----     < 40     40-60      >60      Cholesterol/HDL Ratio 07/10/2024 2.9   Final      Ref Values  Desirable  < 3.4  High Risk  > 5.0    LDL Calculated 07/10/2024 50  <=99 mg/dL Final                                Near   Borderline      AGE      Desirable  Optimal    High     High     Very High     0-19 Y     0 - 109     ---    110-129   >/= 130     ----    20-24 Y     0 - 119     ---    120-159   >/= 160     ----      >24 Y     0 -  99   100-129  130-159   160-189     >/=190      VLDL 07/10/2024 23  0 - 40 mg/dL Final    Triglycerides 07/10/2024 117  0 - 149 mg/dL Final       Age         Desirable   Borderline High   High     Very High   0 D-90 D    19 - 174         ----         ----        ----  91 D- 9 Y     0 -  74        75 -  99     >/= 100      ----    10-19 Y     0 -  89        90 - 129     >/= 130      ----    20-24 Y     0 - 114       115 - 149     >/= 150      ----         >24 Y     0 - 149       150 - 199    200- 499    >/= 500    Venipuncture immediately after or during the administration of Metamizole may lead to falsely low results. Testing should be performed immediately prior to Metamizole dosing.    Non HDL Cholesterol 07/10/2024 73  0 - 149 mg/dL Final          Age       Desirable   Borderline High   High     Very High     0-19 Y     0 - 119       120 - 144     >/= 145    >/= 160    20-24 Y     0 - 149       150 - 189     >/= 190      ----         >24 Y    30 mg/dL above LDL Cholesterol goal      WBC 07/10/2024 6.6  4.4 - 11.3 x10*3/uL Final    nRBC 07/10/2024 0.0  0.0 - 0.0 /100 WBCs Final    RBC 07/10/2024 4.39 (L)  4.50 - 5.90 x10*6/uL Final     Hemoglobin 07/10/2024 13.1 (L)  13.5 - 17.5 g/dL Final    Hematocrit 07/10/2024 41.0  41.0 - 52.0 % Final    MCV 07/10/2024 93  80 - 100 fL Final    MCH 07/10/2024 29.8  26.0 - 34.0 pg Final    MCHC 07/10/2024 32.0  32.0 - 36.0 g/dL Final    RDW 07/10/2024 14.6 (H)  11.5 - 14.5 % Final    Platelets 07/10/2024 215  150 - 450 x10*3/uL Final    Hemoglobin A1C 07/10/2024 5.5  see below % Final    Estimated Average Glucose 07/10/2024 111  Not Established mg/dL Final   Ancillary Procedure on 06/25/2024   Component Date Value Ref Range Status    AV mn grad 06/25/2024 8.0  mmHg Final    AV pk samantha 06/25/2024 1.95  m/s Final    LV Biplane EF 06/25/2024 60  % Final    LVOT diam 06/25/2024 2.30  cm Final    MV E/A ratio 06/25/2024 0.73   Final    LV EF 06/25/2024 65  % Final    RVSP 06/25/2024 26.7  mmHg Final    LVIDd 06/25/2024 5.08  cm Final    Aortic Valve Area by Continuity of* 06/25/2024 2.66  cm2 Final    AV pk grad 06/25/2024 15.2  mmHg Final    Aortic Valve Area by Continuity of* 06/25/2024 2.71  cm2 Final    LV A4C EF 06/25/2024 64.6   Final   Lab on 06/12/2024   Component Date Value Ref Range Status    Color, Urine 06/12/2024 Light-Yellow  Light-Yellow, Yellow, Dark-Yellow Final    Appearance, Urine 06/12/2024 Clear  Clear Final    Specific Gravity, Urine 06/12/2024 1.016  1.005 - 1.035 Final    pH, Urine 06/12/2024 6.5  5.0, 5.5, 6.0, 6.5, 7.0, 7.5, 8.0 Final    Protein, Urine 06/12/2024 100 (2+) (A)  NEGATIVE, 10 (TRACE), 20 (TRACE) mg/dL Final    Glucose, Urine 06/12/2024 Normal  Normal mg/dL Final    Blood, Urine 06/12/2024 NEGATIVE  NEGATIVE Final    Ketones, Urine 06/12/2024 NEGATIVE  NEGATIVE mg/dL Final    Bilirubin, Urine 06/12/2024 NEGATIVE  NEGATIVE Final    Urobilinogen, Urine 06/12/2024 Normal  Normal mg/dL Final    Nitrite, Urine 06/12/2024 NEGATIVE  NEGATIVE Final    Leukocyte Esterase, Urine 06/12/2024 NEGATIVE  NEGATIVE Final    Glucose 06/12/2024 97  74 - 99 mg/dL Final    Sodium 06/12/2024 140   136 - 145 mmol/L Final    Potassium 06/12/2024 3.9  3.5 - 5.3 mmol/L Final    Chloride 06/12/2024 103  98 - 107 mmol/L Final    Bicarbonate 06/12/2024 29  21 - 32 mmol/L Final    Anion Gap 06/12/2024 12  10 - 20 mmol/L Final    Urea Nitrogen 06/12/2024 33 (H)  6 - 23 mg/dL Final    Creatinine 06/12/2024 1.66 (H)  0.50 - 1.30 mg/dL Final    eGFR 06/12/2024 40 (L)  >60 mL/min/1.73m*2 Final    Calculations of estimated GFR are performed using the 2021 CKD-EPI Study Refit equation without the race variable for the IDMS-Traceable creatinine methods.  https://jasn.asnjournals.org/content/early/2021/09/22/ASN.6986875324    Calcium 06/12/2024 10.2  8.6 - 10.3 mg/dL Final    Phosphorus 06/12/2024 4.0  2.5 - 4.9 mg/dL Final    The performance characteristics of phosphorus testing in heparinized plasma have been validated by the individual  laboratory site where testing is performed. Testing on heparinized plasma is not approved by the FDA; however, such approval is not necessary.    Albumin 06/12/2024 4.2  3.4 - 5.0 g/dL Final    Parathyroid Hormone, Intact 06/12/2024 93.1 (H)  18.5 - 88.0 pg/mL Final    WBC 06/12/2024 7.3  4.4 - 11.3 x10*3/uL Final    nRBC 06/12/2024 0.0  0.0 - 0.0 /100 WBCs Final    RBC 06/12/2024 4.67  4.50 - 5.90 x10*6/uL Final    Hemoglobin 06/12/2024 13.8  13.5 - 17.5 g/dL Final    Hematocrit 06/12/2024 42.6  41.0 - 52.0 % Final    MCV 06/12/2024 91  80 - 100 fL Final    MCH 06/12/2024 29.6  26.0 - 34.0 pg Final    MCHC 06/12/2024 32.4  32.0 - 36.0 g/dL Final    RDW 06/12/2024 13.9  11.5 - 14.5 % Final    Platelets 06/12/2024 240  150 - 450 x10*3/uL Final    Vitamin D, 25-Hydroxy, Total 06/12/2024 38  30 - 100 ng/mL Final    Total Protein, Urine Random 06/12/2024 128 (H)  5 - 25 mg/dL Final    Creatinine, Urine Random 06/12/2024 102.9  20.0 - 370.0 mg/dL Final    T. Protein/Creatinine Ratio 06/12/2024 1.24 (H)  0.00 - 0.17 mg/mg Creat Final    WBC, Urine 06/12/2024 NONE  1-5, NONE /HPF Final    RBC,  Urine 06/12/2024 1-2  NONE, 1-2, 3-5 /HPF Final    Mucus, Urine 06/12/2024 FEW  Reference range not established. /LPF Final   Lab on 05/08/2024   Component Date Value Ref Range Status    Glucose 05/08/2024 104 (H)  74 - 99 mg/dL Final    Sodium 05/08/2024 141  136 - 145 mmol/L Final    Potassium 05/08/2024 3.9  3.5 - 5.3 mmol/L Final    Chloride 05/08/2024 105  98 - 107 mmol/L Final    Bicarbonate 05/08/2024 27  21 - 32 mmol/L Final    Anion Gap 05/08/2024 13  10 - 20 mmol/L Final    Urea Nitrogen 05/08/2024 36 (H)  6 - 23 mg/dL Final    Creatinine 05/08/2024 1.50 (H)  0.50 - 1.30 mg/dL Final    eGFR 05/08/2024 45 (L)  >60 mL/min/1.73m*2 Final    Calculations of estimated GFR are performed using the 2021 CKD-EPI Study Refit equation without the race variable for the IDMS-Traceable creatinine methods.  https://jasn.asnjournals.org/content/early/2021/09/22/ASN.7447288749    Calcium 05/08/2024 10.6 (H)  8.6 - 10.3 mg/dL Final    Albumin 05/08/2024 4.4  3.4 - 5.0 g/dL Final    Alkaline Phosphatase 05/08/2024 86  33 - 136 U/L Final    Total Protein 05/08/2024 7.7  6.4 - 8.2 g/dL Final    AST 05/08/2024 14  9 - 39 U/L Final    Bilirubin, Total 05/08/2024 0.8  0.0 - 1.2 mg/dL Final    ALT 05/08/2024 16  10 - 52 U/L Final    Patients treated with Sulfasalazine may generate falsely decreased results for ALT.    Cholesterol 05/08/2024 117  0 - 199 mg/dL Final          Age      Desirable   Borderline High   High     0-19 Y     0 - 169       170 - 199     >/= 200    20-24 Y     0 - 189       190 - 224     >/= 225         >24 Y     0 - 199       200 - 239     >/= 240   **All ranges are based on fasting samples. Specific   therapeutic targets will vary based on patient-specific   cardiac risk.    Pediatric guidelines reference:Pediatrics 2011, 128(S5).Adult guidelines reference: NCEP ATPIII Guidelines,IBRAHIMA 2001, 258:2486-97    Venipuncture immediately after or during the administration of Metamizole may lead to falsely low  results. Testing should be performed immediately prior to Metamizole dosing.    HDL-Cholesterol 05/08/2024 35.6  mg/dL Final      Age       Very Low   Low     Normal    High    0-19 Y    < 35      < 40     40-45     ----  20-24 Y    ----     < 40      >45      ----        >24 Y      ----     < 40     40-60      >60      Cholesterol/HDL Ratio 05/08/2024 3.3   Final      Ref Values  Desirable  < 3.4  High Risk  > 5.0    LDL Calculated 05/08/2024 53  <=99 mg/dL Final                                Near   Borderline      AGE      Desirable  Optimal    High     High     Very High     0-19 Y     0 - 109     ---    110-129   >/= 130     ----    20-24 Y     0 - 119     ---    120-159   >/= 160     ----      >24 Y     0 -  99   100-129  130-159   160-189     >/=190      VLDL 05/08/2024 29  0 - 40 mg/dL Final    Triglycerides 05/08/2024 143  0 - 149 mg/dL Final       Age         Desirable   Borderline High   High     Very High   0 D-90 D    19 - 174         ----         ----        ----  91 D- 9 Y     0 -  74        75 -  99     >/= 100      ----    10-19 Y     0 -  89        90 - 129     >/= 130      ----    20-24 Y     0 - 114       115 - 149     >/= 150      ----         >24 Y     0 - 149       150 - 199    200- 499    >/= 500    Venipuncture immediately after or during the administration of Metamizole may lead to falsely low results. Testing should be performed immediately prior to Metamizole dosing.    Non HDL Cholesterol 05/08/2024 81  0 - 149 mg/dL Final          Age       Desirable   Borderline High   High     Very High     0-19 Y     0 - 119       120 - 144     >/= 145    >/= 160    20-24 Y     0 - 149       150 - 189     >/= 190      ----         >24 Y    30 mg/dL above LDL Cholesterol goal      WBC 05/08/2024 7.8  4.4 - 11.3 x10*3/uL Final    nRBC 05/08/2024 0.0  0.0 - 0.0 /100 WBCs Final    RBC 05/08/2024 4.85  4.50 - 5.90 x10*6/uL Final    Hemoglobin 05/08/2024 14.8  13.5 - 17.5 g/dL Final    Hematocrit  05/08/2024 43.7  41.0 - 52.0 % Final    MCV 05/08/2024 90  80 - 100 fL Final    MCH 05/08/2024 30.5  26.0 - 34.0 pg Final    MCHC 05/08/2024 33.9  32.0 - 36.0 g/dL Final    RDW 05/08/2024 14.0  11.5 - 14.5 % Final    Platelets 05/08/2024 227  150 - 450 x10*3/uL Final    Thyroid Stimulating Hormone 05/08/2024 2.81  0.44 - 3.98 mIU/L Final    Thyroxine, Free 05/08/2024 0.95  0.61 - 1.12 ng/dL Final    Hemoglobin A1C 05/08/2024 5.6  see below % Final    Estimated Average Glucose 05/08/2024 114  Not Established mg/dL Final        Assessment/Plan     Problem List Items Addressed This Visit       Chronic kidney disease, stage 3b (Multi)    Hyperglycemia    Malignant neoplasm of prostate (Multi)    Thyroid disorder    Essential hypertension    HLD (hyperlipidemia)     Other Visit Diagnoses       Encounter for Medicare annual wellness exam              Advance Care Planning Note   Discussion Date: 07/16/24   Discussion Participants: patient  16 min spent discussing ACP w pt    The patient wishes to discuss Advance Care Planning today and the following is a brief summary of our discussion.     Patient has capacity to make their own medical decisions: Yes  Health Care Agent/Surrogate Decision Maker documented in chart: Yes    Documents on file and valid:  Advance Directive/Living Will: Yes   Health Care Power of : Yes  Communication of Medical Status/Prognosis:   yes   Communication of Treatment Goals/Options:   yes  Treatment Decisions/involved with patient today  yes  Time Statement: Total face to face time spent on advance care planning was <30 minutes with <30 minutes spent in counseling, including the explanation.    SEE ME AT NEXT REGULARLY SCHEDULED VISIT-sooner if condition deteriorates or new problems arise.    PATIENT WOULD LIKE TO BE A FULL CODE    NO uncontrolled DEPRESSION noted  Assessed and reviewed for opioid use.  NO EVIDENCE OF SIGNIFICANT DEMENTIA    Signs and symptoms of concern with depression-if  in crisis -(no current HI/SI) will let us know and proceed to ER   Signs/symptoms of concern with dementia-and need to contact us if they occur discussed.    ASCVD   est HIGH bc OVER 81 yo  -%   addressed and risk reduction conversation took place    All above counseling 15 minutes in conversation/documentation etc    Mood counseling 5min- no uncontrolled depression, good support system, has crisis plan if occurs.  Included BMI counseling and options if BMI>30        QUESTIONS answered    Labs and appt 6mo  Sooner if issues  Advised fall flu shot  Small shaving denys on chin-healing  No cellulitis  Echo ok  HR at home 40s at times  P ox ok    Next visit addressed -regular visit as scheduled and follow up sooner if condition deterioration or new problems arise.    This completes Jl Brand ANNUAL MEDICARE WELLNESS VISIT today.  If no other follow ups discussed: Please follow up in 1 year for NEXT ANNUAL MEDICARE WELLNESS VISIT.  6mo labs and appt w me  Offered cardiology appt -declines for now  Advised to see at least once  For slow HR since 40s    Helen Hooper MD    This documentation is subject to inadvertent typing and other similar clerical/grammatic errors etc.

## 2024-08-09 ENCOUNTER — APPOINTMENT (OUTPATIENT)
Dept: CARDIOLOGY | Facility: CLINIC | Age: 89
End: 2024-08-09
Payer: MEDICARE

## 2024-08-09 VITALS
SYSTOLIC BLOOD PRESSURE: 122 MMHG | HEIGHT: 70 IN | DIASTOLIC BLOOD PRESSURE: 72 MMHG | HEART RATE: 56 BPM | WEIGHT: 253.4 LBS | BODY MASS INDEX: 36.28 KG/M2

## 2024-08-09 DIAGNOSIS — R06.02 SHORTNESS OF BREATH: ICD-10-CM

## 2024-08-09 DIAGNOSIS — R00.1 BRADYCARDIA: ICD-10-CM

## 2024-08-09 DIAGNOSIS — I10 ESSENTIAL HYPERTENSION: Primary | ICD-10-CM

## 2024-08-09 DIAGNOSIS — E07.9 THYROID DISORDER: ICD-10-CM

## 2024-08-09 DIAGNOSIS — E78.5 HYPERLIPIDEMIA, UNSPECIFIED HYPERLIPIDEMIA TYPE: ICD-10-CM

## 2024-08-09 PROCEDURE — 99205 OFFICE O/P NEW HI 60 MIN: CPT | Performed by: NURSE PRACTITIONER

## 2024-08-09 PROCEDURE — 1159F MED LIST DOCD IN RCRD: CPT | Performed by: NURSE PRACTITIONER

## 2024-08-09 PROCEDURE — 1036F TOBACCO NON-USER: CPT | Performed by: NURSE PRACTITIONER

## 2024-08-09 PROCEDURE — 1160F RVW MEDS BY RX/DR IN RCRD: CPT | Performed by: NURSE PRACTITIONER

## 2024-08-09 PROCEDURE — 93000 ELECTROCARDIOGRAM COMPLETE: CPT | Performed by: NURSE PRACTITIONER

## 2024-08-09 PROCEDURE — 1157F ADVNC CARE PLAN IN RCRD: CPT | Performed by: NURSE PRACTITIONER

## 2024-08-09 PROCEDURE — 3078F DIAST BP <80 MM HG: CPT | Performed by: NURSE PRACTITIONER

## 2024-08-09 PROCEDURE — 3074F SYST BP LT 130 MM HG: CPT | Performed by: NURSE PRACTITIONER

## 2024-08-09 RX ORDER — METOPROLOL SUCCINATE 50 MG/1
25 TABLET, EXTENDED RELEASE ORAL DAILY
Start: 2024-08-09

## 2024-08-09 NOTE — PROGRESS NOTES
Jl Brand is a 88 y.o. male that presents to the office today with his daughter Melani for  new patient cardiac evaluation referred by Dr. Hooper for bradycardia. He has a PMH of hypertension; hyperlipidemia, hypothyroidism, chronic kidney disease followed by nephrology Dr. Claros, prostate cancer, osteoarthritis with bilateral knee replacements.  He denies tobacco use.  Positive for alcohol use socially.  Caffeine 2 cups of coffee per day.  Denies exercising on a regular basis. He is independent, lives with his daughter Melani and son in law.    He states that overall he feels well.  He denies any chest pain, chest pressure, chest tightness, palpitations.  He does admit to frequent lightheadedness and fatigue.  He also admits to exertional shortness of breath that he feels is worse with going up and down steps.  He also admits to occasional bilateral lower extremity edema.      Testing Reviewed  EKG obtained in the office today and verified with Dr. Espinal  shows Sinus bradycardia, non specific ST abnormality HR  56 bpm, QT/Qtc 432/416    7/10/2024 labs; , K3.8, BUN 35, creatinine 1.54, ALT 15, AST 12, cholesterol 112, HDL 38, LDL 50, triglycerides 117, HgbA1c 5.5, WBC 6.6, Hgb 13.1, platelets 259.    6/25/2024 echocardiogram;  1. The left ventricular systolic function is normal, with a visually estimated ejection fraction of 65%.   2. There is moderate concentric left ventricular hypertrophy.   3. There is normal right ventricular global systolic function.   4. Normal chamber sizes.   5. No significant valvular heart disease.   6. No comparison study available.    Assessment/Plan  Bradycardia; EKG as noted above.  He keeps a detailed record of heart rates at home which show variability over 40s to 60s.  Will decrease metoprolol succinate to 25 mg daily.  Obtain 48-hour Holter monitor  Exertional shortness of breath; echocardiogram as noted above  Lightheadedness; suspect secondary to  bradycardia  Hypertension; well-controlled  Hyperlipidemia; well-controlled  Follow-up in the office with Dr. Bella for results and further recommendations      Patient Active Problem List   Diagnosis    Chronic kidney disease, stage 3b (Multi)    Gout    Hyperglycemia    Low vitamin D level    Malignant neoplasm of prostate (Multi)    Otomycosis    Overactive bladder    Thyroid disorder    Obesity, morbid (Multi)    Obesity (BMI 30-39.9)    Primary osteoarthritis of left knee    Primary osteoarthritis of right knee    Other specified glaucoma    Essential hypertension    HLD (hyperlipidemia)    Bradycardia       Social History     Tobacco Use    Smoking status: Never    Smokeless tobacco: Never   Substance Use Topics    Alcohol use: Yes     Alcohol/week: 4.0 standard drinks of alcohol     Types: 4 Cans of beer per week    Drug use: Never       Past Medical History:   Diagnosis Date    Glaucoma          Current Outpatient Medications:     amLODIPine (Norvasc) 5 mg tablet, Take 1 tablet (5 mg) by mouth 2 times a day., Disp: 180 tablet, Rfl: 0    aspirin 81 mg EC tablet, Take 1 tablet (81 mg) by mouth once daily., Disp: , Rfl:     atorvastatin (Lipitor) 20 mg tablet, Take 1 tablet (20 mg) by mouth once daily., Disp: 90 tablet, Rfl: 3    cholecalciferol (Vitamin D-3) 50 mcg (2,000 unit) capsule, Take by mouth., Disp: , Rfl:     flaxseed oiL 1,000 mg capsule, Take 2 capsules (2,000 mg) by mouth., Disp: , Rfl:     hydrALAZINE (Apresoline) 50 mg tablet, Take 1 tablet (50 mg) by mouth 3 times a day., Disp: 270 tablet, Rfl: 3    hydroCHLOROthiazide (HYDRODiuril) 25 mg tablet, Take 1 tablet (25 mg) by mouth once daily in the morning. Take before meals., Disp: 90 tablet, Rfl: 3    levothyroxine (Synthroid, Levoxyl) 50 mcg tablet, Take 1 tablet (50 mcg) by mouth once daily in the morning. Take before meals. take on an empty stomach, Disp: 90 tablet, Rfl: 3    losartan (Cozaar) 100 mg tablet, Take 1 tablet (100 mg) by mouth  "once daily., Disp: 90 tablet, Rfl: 3    magnesium oxide 500 mg capsule, Take by mouth., Disp: , Rfl:     multivitamin tablet, Take by mouth., Disp: , Rfl:     niacin 500 mg tablet, Take 2 tablets (1,000 mg) by mouth., Disp: , Rfl:     timolol (Timoptic) 0.25 % ophthalmic solution, , Disp: , Rfl:     metoprolol succinate XL (Toprol-XL) 50 mg 24 hr tablet, Take 0.5 tablets (25 mg) by mouth once daily., Disp: , Rfl:     Celecoxib, Penicillins, and Sulfa (sulfonamide antibiotics)    Family History   Problem Relation Name Age of Onset    Diabetes Mother         Past Surgical History:   Procedure Laterality Date    CATARACT EXTRACTION  2015    COLONOSCOPY  2017    TOTAL KNEE ARTHROPLASTY Right 2021          Review of systems  Constitutional: No weight loss, fever, chills, weakness or fatigue  HEENT: No visual loss, blurred vision, double vision or yellow sclerae  Skin: No rash or itching  Cardiovascular: No chest pain, pressure or discomfort, No palpitations.  Positive for occasional lower extremity edema.  Respiratory: Positive for exertional shortness of breath, denies cough or sputum  Gastrointestinal: No nausea, vomiting or diarrhea. No bloody or dark tarry stools.  Neurological: Positive for lightheadedness, dizziness.  No headache, syncope. Musculoskeletal: No muscle, back pain, joint pain or stiffness.  Hematologic: No anemia, bleeding or bruising.    /72 (BP Location: Left arm, Patient Position: Sitting)   Pulse 56   Ht 1.778 m (5' 10\")   Wt 115 kg (253 lb 6.4 oz)   BMI 36.36 kg/m²     Patient Active Problem List   Diagnosis    Chronic kidney disease, stage 3b (Multi)    Gout    Hyperglycemia    Low vitamin D level    Malignant neoplasm of prostate (Multi)    Otomycosis    Overactive bladder    Thyroid disorder    Obesity, morbid (Multi)    Obesity (BMI 30-39.9)    Primary osteoarthritis of left knee    Primary osteoarthritis of right knee    Other specified glaucoma    Essential hypertension    HLD " (hyperlipidemia)    Bradycardia         Physical Exam  Constitutional: Well developed, awake/alert x 3, no distress.  Head/Neck: No JVD, No bruits  Respiratory/Thorax: patent airways, CTAB, normal breath sounds with good expansion.  Cardiovascular: Regular rate and rhythm, no murmurs, normal S1 and S2,   Gastrointestinal: Non distended, soft, non-tender, no rebound tenderness or guarding.  Extremities: No cyanosis, trace bilateral lower extremity edema.    Neurological: Alert and oriented x 3. Moves extremities spontaneous with purpose.  Psychological: Appropriate mood and behavior  Skin: Warm and Dry. No lesions or rashes.         Please excuse any errors in grammar or translation related to dictation, voice recognition software was used to prepare this document.

## 2024-08-09 NOTE — PATIENT INSTRUCTIONS
You will be scheduled for a heart monitor to be applied.  On the day you come in to have the monitor applied, please shower prior to coming in and do not apply any creams, lotions or powders to your chest prior to coming in on the day your monitor is scheduled to be applied.

## 2024-08-26 ENCOUNTER — OFFICE VISIT (OUTPATIENT)
Dept: OTOLARYNGOLOGY | Facility: CLINIC | Age: 89
End: 2024-08-26
Payer: MEDICARE

## 2024-08-26 ENCOUNTER — APPOINTMENT (OUTPATIENT)
Dept: AUDIOLOGY | Facility: CLINIC | Age: 89
End: 2024-08-26
Payer: MEDICARE

## 2024-08-26 VITALS
HEART RATE: 60 BPM | SYSTOLIC BLOOD PRESSURE: 150 MMHG | WEIGHT: 251 LBS | TEMPERATURE: 98 F | HEIGHT: 70 IN | DIASTOLIC BLOOD PRESSURE: 83 MMHG | BODY MASS INDEX: 35.93 KG/M2

## 2024-08-26 DIAGNOSIS — L29.9 EAR ITCHING: ICD-10-CM

## 2024-08-26 DIAGNOSIS — H62.43 OTOMYCOSIS OF BOTH EARS: Primary | ICD-10-CM

## 2024-08-26 DIAGNOSIS — H93.8X3 SENSATION OF PLUGGED EAR ON BOTH SIDES: ICD-10-CM

## 2024-08-26 DIAGNOSIS — B36.9 OTOMYCOSIS OF BOTH EARS: Primary | ICD-10-CM

## 2024-08-26 DIAGNOSIS — H61.23 BILATERAL IMPACTED CERUMEN: ICD-10-CM

## 2024-08-26 PROCEDURE — 69210 REMOVE IMPACTED EAR WAX UNI: CPT | Mod: 50 | Performed by: NURSE PRACTITIONER

## 2024-08-26 PROCEDURE — 3077F SYST BP >= 140 MM HG: CPT | Performed by: NURSE PRACTITIONER

## 2024-08-26 PROCEDURE — 1126F AMNT PAIN NOTED NONE PRSNT: CPT | Performed by: NURSE PRACTITIONER

## 2024-08-26 PROCEDURE — 1157F ADVNC CARE PLAN IN RCRD: CPT | Performed by: NURSE PRACTITIONER

## 2024-08-26 PROCEDURE — 69210 REMOVE IMPACTED EAR WAX UNI: CPT | Performed by: NURSE PRACTITIONER

## 2024-08-26 PROCEDURE — 99213 OFFICE O/P EST LOW 20 MIN: CPT | Performed by: NURSE PRACTITIONER

## 2024-08-26 PROCEDURE — 3079F DIAST BP 80-89 MM HG: CPT | Performed by: NURSE PRACTITIONER

## 2024-08-26 PROCEDURE — 1159F MED LIST DOCD IN RCRD: CPT | Performed by: NURSE PRACTITIONER

## 2024-08-26 RX ORDER — NYSTATIN 100000 [USP'U]/G
POWDER TOPICAL
Qty: 60 G | Refills: 0 | Status: SHIPPED | OUTPATIENT
Start: 2024-08-26

## 2024-08-26 ASSESSMENT — ENCOUNTER SYMPTOMS
OCCASIONAL FEELINGS OF UNSTEADINESS: 0
LOSS OF SENSATION IN FEET: 0
DEPRESSION: 0

## 2024-08-26 ASSESSMENT — PAIN SCALES - GENERAL: PAINLEVEL: 0-NO PAIN

## 2024-08-26 NOTE — PROGRESS NOTES
Subjective   Patient ID: Jl Brand is a 88 y.o. male who presents for ear cleaning.    HPI  Patient here for routine ear cleaning. Last cleaning was 3/20/2024. Ears are feeling full. The right is itching, used sweet oil which helped.     Patient Active Problem List   Diagnosis    Chronic kidney disease, stage 3b (Multi)    Gout    Hyperglycemia    Low vitamin D level    Malignant neoplasm of prostate (Multi)    Otomycosis    Overactive bladder    Thyroid disorder    Obesity, morbid (Multi)    Obesity (BMI 30-39.9)    Primary osteoarthritis of left knee    Primary osteoarthritis of right knee    Other specified glaucoma    Essential hypertension    HLD (hyperlipidemia)    Bradycardia     Past Surgical History:   Procedure Laterality Date    CATARACT EXTRACTION  2015    COLONOSCOPY  2017    TOTAL KNEE ARTHROPLASTY Right 2021     Review of Systems    All other systems have been reviewed and are negative for complaints except for those mentioned in history of present illness, past medical history and problem list.    Objective   Physical Exam    Right Ear: External inspection of ear with no deformity, scars, or masses. EAC is impacted with cerumen, TM not visible.     Left Ear: External inspection of ear with no deformity, scars, or masses. EAC is impacted with cerumen, TM not visible.     Procedure: Cerumen Removal  Indication: Cerumen Impaction  Risks, benefits, alternatives, and expectations discussed with patient and patient wishes to proceed.    Bilateral canals with cerumen impaction.  Using the microscope, suction, and alligator forceps, large amounts of soft brown cerumen removed bilaterally. Both sides had fungal appearing debris present, R > L. Both TMs intact. No effusions or retractions noted.  Patient tolerated procedure well.      Assessment/Plan   Diagnoses and all orders for this visit:  Sensation of plugged ear on both sides  Bilateral impacted cerumen  Ear itching  Otomycosis, bilateral  Ears were  successfully cleaned.  I recommend Nystatin powder twice daily for the next 2 weeks. Follow up in 2 weeks. We will reschedule hearing test as I do not want him tested today with fungal infection.  Call the office with any new or worsening symptoms.  All questions answered to patient satisfaction.     DELFINO Middleton-CNP 08/26/24 1:05 PM

## 2024-08-26 NOTE — PROGRESS NOTES
"AUDIOLOGIC EVALUATION  Name: Jl Brand  YOB: 1935  MRN: [id number]  Age: 88 y.o.    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 Word recognition abilities were measured to be good in the right ear and fair in the left ear.     Hearing Aid Information  Right: Phonak Audeo P50-R  SN [id number]  *** , *** dome   Left: Phonak Audeo P50-R  SN [id number] *** , *** dome   Warranty expiration 3/8/2025  Fit date: 12/22/2021    Evaluation:    Otoscopy  {otoscopy:35281}    Tympanometry  Right ear: {tymp type:67964}  Left ear: {tymp type:49123}    Acoustic Reflexes  Right ear: {reflexes:67327::\"Ipsilateral and contralateral (probe right stimulus left) acoustic reflexes present at 500 - 4000 Hz\"}  Left ear: {reflexes:19592::\"Ipsilateral and contralateral (probe right stimulus left) acoustic reflexes present at 500 - 4000 Hz\"}    Distortion Product Otoacoustic Emissions (DPOAEs)  Right ear: {DPOAEs:53552::\"Present 0423-0873 Hz\"}  Left ear: {DPOAEs:40165::\"Present 4253-2713 Hz\"}    Audiometric Evaluation  Right ear: hearing sensitivity within normal limits ***. Word recognition ability estimated to be *** (***%) at *** dB HL based on an NU-6 recorded {10/25:28785::\"ordered by difficulty 10-word list.\"}  Left ear: hearing sensitivity within normal limits ***. Word recognition ability estimated to be *** (***%) at *** dB HL based on an NU-6 recorded {10/25:18545::\"ordered by difficulty 10-word list.\"}    When compared to previous test results of ***, thresholds are stable.    {discussresults:93717::\"The test results were discussed with the patient and they were returned to *** for completion of the office visit.\"}    Hearing Aid Check Procedure  ***    Impressions  Today's " evaluation revealed ***    Recommendations  1) Continue use of binaural amplification  2) Schedule hearing aid checks as needed  3) Re-test hearing annually     Time: ***    NORBERTO Anderson, CCC-A  Licensed Audiologist

## 2024-09-05 ENCOUNTER — APPOINTMENT (OUTPATIENT)
Dept: CARDIOLOGY | Facility: HOSPITAL | Age: 89
End: 2024-09-05
Payer: MEDICARE

## 2024-09-05 ENCOUNTER — APPOINTMENT (OUTPATIENT)
Dept: CARDIOLOGY | Facility: CLINIC | Age: 89
End: 2024-09-05
Payer: MEDICARE

## 2024-09-05 DIAGNOSIS — R00.1 BRADYCARDIA: ICD-10-CM

## 2024-09-05 PROCEDURE — 93225 XTRNL ECG REC<48 HRS REC: CPT | Performed by: INTERNAL MEDICINE

## 2024-09-05 PROCEDURE — 93227 XTRNL ECG REC<48 HR R&I: CPT | Performed by: INTERNAL MEDICINE

## 2024-09-25 ENCOUNTER — APPOINTMENT (OUTPATIENT)
Dept: AUDIOLOGY | Facility: CLINIC | Age: 89
End: 2024-09-25
Payer: MEDICARE

## 2024-09-25 ENCOUNTER — APPOINTMENT (OUTPATIENT)
Dept: OTOLARYNGOLOGY | Facility: CLINIC | Age: 89
End: 2024-09-25
Payer: MEDICARE

## 2024-10-03 ENCOUNTER — APPOINTMENT (OUTPATIENT)
Dept: CARDIOLOGY | Facility: CLINIC | Age: 89
End: 2024-10-03
Payer: MEDICARE

## 2024-10-03 VITALS
BODY MASS INDEX: 36.82 KG/M2 | WEIGHT: 257.2 LBS | DIASTOLIC BLOOD PRESSURE: 62 MMHG | HEIGHT: 70 IN | HEART RATE: 60 BPM | SYSTOLIC BLOOD PRESSURE: 130 MMHG

## 2024-10-03 DIAGNOSIS — I49.5 SINUS NODE DYSFUNCTION (MULTI): ICD-10-CM

## 2024-10-03 DIAGNOSIS — I10 ESSENTIAL HYPERTENSION: Primary | ICD-10-CM

## 2024-10-03 DIAGNOSIS — E78.5 HYPERLIPIDEMIA, UNSPECIFIED HYPERLIPIDEMIA TYPE: ICD-10-CM

## 2024-10-03 DIAGNOSIS — R00.1 SINUS BRADYCARDIA: ICD-10-CM

## 2024-10-03 PROCEDURE — 3075F SYST BP GE 130 - 139MM HG: CPT | Performed by: INTERNAL MEDICINE

## 2024-10-03 PROCEDURE — G2211 COMPLEX E/M VISIT ADD ON: HCPCS | Performed by: INTERNAL MEDICINE

## 2024-10-03 PROCEDURE — 99215 OFFICE O/P EST HI 40 MIN: CPT | Performed by: INTERNAL MEDICINE

## 2024-10-03 PROCEDURE — 3078F DIAST BP <80 MM HG: CPT | Performed by: INTERNAL MEDICINE

## 2024-10-03 PROCEDURE — 1159F MED LIST DOCD IN RCRD: CPT | Performed by: INTERNAL MEDICINE

## 2024-10-03 PROCEDURE — 1157F ADVNC CARE PLAN IN RCRD: CPT | Performed by: INTERNAL MEDICINE

## 2024-10-03 PROCEDURE — 1036F TOBACCO NON-USER: CPT | Performed by: INTERNAL MEDICINE

## 2024-10-03 RX ORDER — HYDRALAZINE HYDROCHLORIDE 100 MG/1
TABLET, FILM COATED ORAL
Qty: 180 TABLET | Refills: 3 | Status: SHIPPED | OUTPATIENT
Start: 2024-10-03

## 2024-10-03 NOTE — PROGRESS NOTES
Patient:  Jl Brand  YOB: 1935  MRN: [id number]       Impression/Plan:     Diagnoses and all orders for this visit:  Hyperlipidemia, unspecified hyperlipidemia type  -     He currently tolerates Lipitor without adverse effect evidently cholesterol had been significantly elevated and currently is well-controlled and would continue on this dose of atorvastatin.  There has been some suggestion that statins may reduce late onset dementia and for now would continue.  -      There is no benefit of flaxseed or niacin for the control of cholesterol in the setting of normal triglycerides and would discontinue both of those agents.  -        He has no evidence of active vascular disease and has no benefit to stay on aspirin and should discontinue that agent.  Sinus bradycardia  Sinus node dysfunction (Multi)  -     Clear-cut sinus node disease without significant heart block absolutely no indication to continue beta-blocker would discontinue altogether  -      He did have 1 run of short-lived SVT rate less than 110 there is a possibility that without any beta-blocker he could have symptomatic SVT I suggested should that recur to resume his low-dose metoprolol at Monday Wednesday Friday.  -      Discussed in detail with he and his daughter that it is possible eventually he would require pacemaker support but there is no indication of such a need at this time    Hypertension        -      Reasonably controlled but medical regimen is somewhat complex will plan to discontinue beta-blocker as described above would switch the hydralazine from 50 3 times daily to 100 twice daily.  Will also discontinue magnesium as no indication.  BMP and magnesium level in about 2 weeks to assure that he does not require magnesium supplementation.        Chief Complaint/Active Symptoms:       Jl Brand is a 88 y.o. male who presents with hypertension, hyperlipidemia prostate cancer, chronic kidney disease, hypothyroidism and  sinus node dysfunction with sinus bradycardia.  He has not had history of myocardial infarction, stroke or symptoms of peripheral vascular disease..  2007 normal stress perfusion study done in [place].    Was seen as new consultation by Soumya Garvin cardiac NP 8/9/2024 for concerns of bradycardia.  No cardiovascular symptoms with past history of hypertension hyperlipidemia and chronic kidney disease followed by nephrology.  He had no chest pain or shortness of breath.  ECG showed sinus bradycardia heart rate 56 normal cardiac intervals.  Echo in June had been unremarkable with a 65% EF and moderate concentric LVH.  His metoprolol succinate was reduced to 25 mg a day and monitoring was ordered with follow-up with me.    48-hour Holter monitor 9/5/2024  1.  Decreased overall heart rate variability and heart rate blunting noted.  2.Isolated ventricular and supraventricular premature beats noted.  3.  VA interval appear normal  4.  Short runs of supraventricular premature beats, longest 6 beats fastest 108 bpm  5.  No atrial fibrillation  6.  Patient was reasonably active during this Holter with activities like going up and down stairs taking out trash cleaning litter boxes, did not report any symptoms.    6/25/2024 echocardiogram;  1. The left ventricular systolic function is normal, with a visually estimated ejection fraction of 65%.   2. There is moderate concentric left ventricular hypertrophy.   3. There is normal right ventricular global systolic function.   4. Normal chamber sizes.   5. No significant valvular heart disease.    He presents at this time in follow-up to discuss results of his monitor and for symptoms after adjustment of beta-blocker.  He had no specific complaints prior to the change of medication.  His heart rate was asymptomatic albeit bradycardic.  As can be seen above his monitor does show still with considerable bradycardia average heart rates during the day in the low 60s and at night 50s.   This was on 25 of Toprol.  This medicine was utilized for blood pressure control he has not has history of dysrhythmia and has had no coronary disease.  He is also on flaxseed and niacin and magnesium and he has no recollection as to why those were initiated many years ago.  He is not on the flaxseed for dry eyes.  He is taking Lipitor regularly.  I reviewed with him again to assure he has no history of vascular disease and indeed that is the case he has had stress test remotely that showed no ischemia.  He has had no stroke he has had no exam findings to suggest vascular disease.  He currently is free of chest tightness pressure heaviness shortness of breath though he leads a fairly sedentary lifestyle.               Review of Systems: Unremarkable except as noted above    Meds     Current Outpatient Medications   Medication Instructions    amLODIPine (NORVASC) 5 mg, oral, 2 times daily    atorvastatin (LIPITOR) 20 mg, oral, Daily    cholecalciferol (Vitamin D-3) 50 mcg (2,000 unit) capsule oral    hydrALAZINE (Apresoline) 100 mg tablet 1 FULL TABLET TWICE A DAY    hydroCHLOROthiazide (HYDRODIURIL) 25 mg, oral, Daily before breakfast    levothyroxine (SYNTHROID, LEVOXYL) 50 mcg, oral, Daily before breakfast, take on an empty stomach    losartan (COZAAR) 100 mg, oral, Daily    multivitamin tablet oral    timolol (Timoptic) 0.25 % ophthalmic solution         Allergies     Allergies   Allergen Reactions    Celecoxib Diarrhea    Penicillins Unknown    Sulfa (Sulfonamide Antibiotics) Diarrhea         Annotated Problems     Specialty Problems          Cardiology Problems    Essential hypertension     Last Assessment & Plan: Formatting of this note might be different from the original. Assessment: Managed with med   Date:        BP:    09/10/2021   148/81   Stable.         HLD (hyperlipidemia)     Last Assessment & Plan: Formatting of this note might be different from the original. Assessment: Managed with med          "Bradycardia    Sinus node dysfunction (Multi)        Problem List     Patient Active Problem List    Diagnosis Date Noted    Sinus node dysfunction (Multi) 10/03/2024    Bradycardia 08/09/2024    Obesity, morbid (Multi) 06/14/2023    Chronic kidney disease, stage 3b (Multi) 05/31/2023    Gout 05/31/2023    Hyperglycemia 05/31/2023    Low vitamin D level 05/31/2023    Malignant neoplasm of prostate (Multi) 05/31/2023    Otomycosis 05/31/2023    Overactive bladder 05/31/2023    Thyroid disorder 05/31/2023    Obesity (BMI 30-39.9) 05/20/2021    Other specified glaucoma 05/20/2021    Essential hypertension 05/20/2021    HLD (hyperlipidemia) 05/20/2021    Primary osteoarthritis of left knee 05/12/2021    Primary osteoarthritis of right knee 05/12/2021       Objective:     Vitals:    10/03/24 1502   BP: 130/62   BP Location: Left arm   Patient Position: Sitting   Pulse: 60   Weight: 117 kg (257 lb 3.2 oz)   Height: 1.778 m (5' 10\")      Wt Readings from Last 4 Encounters:   10/03/24 117 kg (257 lb 3.2 oz)   08/26/24 114 kg (251 lb)   08/09/24 115 kg (253 lb 6.4 oz)   07/16/24 114 kg (252 lb)           LAB:     Lab Results   Component Value Date    WBC 6.6 07/10/2024    HGB 13.1 (L) 07/10/2024    HCT 41.0 07/10/2024     07/10/2024    CHOL 112 07/10/2024    TRIG 117 07/10/2024    HDL 38.7 07/10/2024    ALT 15 07/10/2024    AST 12 07/10/2024     07/10/2024    K 3.8 07/10/2024     07/10/2024    CREATININE 1.54 (H) 07/10/2024    BUN 35 (H) 07/10/2024    CO2 30 07/10/2024    TSH 2.81 05/08/2024    PSA <0.1 03/28/2019    HGBA1C 5.5 07/10/2024       Diagnostic Studies:     Patient was never admitted.      Radiology:     No orders to display       Physical Exam     General Appearance: alert and oriented to person, place and time, in no acute distress, obese  Cardiovascular: normal rate, regular rhythm, normal S1 and S2, no murmurs, rubs, clicks, or gallops,  no JVD  Pulmonary/Chest: clear to auscultation " bilaterally- no wheezes, rales or rhonchi, normal air movement, no respiratory distress  Abdomen: soft, non-tender, non-distended, normal bowel sounds, no masses   Extremities: no cyanosis, clubbing or edema  Skin: warm and dry, no rash or erythema  Eyes: EOMI  Neck: supple and non-tender without mass, no thyromegaly   Neurological: alert, oriented, normal speech, no focal findings or movement disorder noted  Vascular: pulses 2+ no bruits

## 2024-10-03 NOTE — PATIENT INSTRUCTIONS
CHANGE HYDRALAZINE  MG TWICE A DAY.  YOU CAN TAKE 2-50 MG TABLETS TOGETHER TWICE A DAY TO USE UP YOUR CURRENT SUPPLY.  A NEW PRESCRIPTION HAS BEEN SENT TO YOUR PHARMACY  MG TABLETS TWICE A DAY.     STOP METOPROLOL SUCCINATE, ASPIRIN, MAGNESIUM OXIDE, NIACIN AND FLAXSEED    PER DR LAND IF YOU FEEL WORSE OR HAVE FAST HEART BEATS WITH MEDICATION CHANGES YOU CAN RESUME METOPROLOL SUCCINATE 25 MG 1 FULL TABLET ON MON, WEDS AND FRI ONLY.  CALL DR LAND TO NOTIFY HIM. YOUR CHART MEDICATION LIST WILL NEED TO BE UPDATED    NON FASTING LABS TO BE DONE IN 2 WEEKS 10/17/24    DID YOU KNOW  We have a pharmacy here in the [facility].  They can fill all prescriptions, not just cardiac medications.  Prescriptions from other pharmacies can easily be transferred to the  pharmacy by the  pharmacist on site.   pharmacies offer FREE HOME DELIVERY on medications to anywhere in [place]. They can sync your medications. Typically prescriptions can be ready in 10 - 15 minutes. If pharmacy is unable to fill your  prescription or if cost is more than your paying now the Pharmacist can easily transfer back to your Pharmacy of choice. Pharmacy phone # [phone].     Please bring all medicines, vitamins, and herbal supplements with you in original bottles to every appointment! This is the best way to ensure your medication list in your chart is accurate.    Prescriptions will not be filled unless you are compliant with your follow up appointments or have a follow up appointment scheduled as per instruction of your physician. Refills should be requested at the time of your visit.

## 2024-10-07 ENCOUNTER — CLINICAL SUPPORT (OUTPATIENT)
Dept: AUDIOLOGY | Facility: CLINIC | Age: 89
End: 2024-10-07
Payer: MEDICARE

## 2024-10-07 ENCOUNTER — OFFICE VISIT (OUTPATIENT)
Dept: OTOLARYNGOLOGY | Facility: CLINIC | Age: 89
End: 2024-10-07
Payer: MEDICARE

## 2024-10-07 VITALS
SYSTOLIC BLOOD PRESSURE: 149 MMHG | WEIGHT: 254 LBS | OXYGEN SATURATION: 98 % | BODY MASS INDEX: 36.36 KG/M2 | HEART RATE: 57 BPM | DIASTOLIC BLOOD PRESSURE: 92 MMHG | HEIGHT: 70 IN

## 2024-10-07 DIAGNOSIS — H90.3 SENSORINEURAL HEARING LOSS (SNHL), BILATERAL: Primary | ICD-10-CM

## 2024-10-07 DIAGNOSIS — H61.23 BILATERAL IMPACTED CERUMEN: ICD-10-CM

## 2024-10-07 DIAGNOSIS — L29.9 EAR ITCHING: Primary | ICD-10-CM

## 2024-10-07 DIAGNOSIS — H93.8X3 SENSATION OF PLUGGED EAR ON BOTH SIDES: ICD-10-CM

## 2024-10-07 PROCEDURE — 3077F SYST BP >= 140 MM HG: CPT | Performed by: NURSE PRACTITIONER

## 2024-10-07 PROCEDURE — 3080F DIAST BP >= 90 MM HG: CPT | Performed by: NURSE PRACTITIONER

## 2024-10-07 PROCEDURE — 1159F MED LIST DOCD IN RCRD: CPT | Performed by: NURSE PRACTITIONER

## 2024-10-07 PROCEDURE — 69210 REMOVE IMPACTED EAR WAX UNI: CPT | Performed by: NURSE PRACTITIONER

## 2024-10-07 PROCEDURE — 1126F AMNT PAIN NOTED NONE PRSNT: CPT | Performed by: NURSE PRACTITIONER

## 2024-10-07 PROCEDURE — 99212 OFFICE O/P EST SF 10 MIN: CPT | Performed by: NURSE PRACTITIONER

## 2024-10-07 PROCEDURE — 92567 TYMPANOMETRY: CPT | Mod: RT | Performed by: AUDIOLOGIST

## 2024-10-07 PROCEDURE — 92557 COMPREHENSIVE HEARING TEST: CPT | Performed by: AUDIOLOGIST

## 2024-10-07 PROCEDURE — 69210 REMOVE IMPACTED EAR WAX UNI: CPT | Mod: 50,CCI | Performed by: NURSE PRACTITIONER

## 2024-10-07 PROCEDURE — 1157F ADVNC CARE PLAN IN RCRD: CPT | Performed by: NURSE PRACTITIONER

## 2024-10-07 PROCEDURE — 1036F TOBACCO NON-USER: CPT | Performed by: NURSE PRACTITIONER

## 2024-10-07 ASSESSMENT — PATIENT HEALTH QUESTIONNAIRE - PHQ9
1. LITTLE INTEREST OR PLEASURE IN DOING THINGS: NOT AT ALL
SUM OF ALL RESPONSES TO PHQ9 QUESTIONS 1 AND 2: 0
2. FEELING DOWN, DEPRESSED OR HOPELESS: NOT AT ALL

## 2024-10-07 ASSESSMENT — ENCOUNTER SYMPTOMS
OCCASIONAL FEELINGS OF UNSTEADINESS: 0
DEPRESSION: 0
LOSS OF SENSATION IN FEET: 0

## 2024-10-07 ASSESSMENT — COLUMBIA-SUICIDE SEVERITY RATING SCALE - C-SSRS
6. HAVE YOU EVER DONE ANYTHING, STARTED TO DO ANYTHING, OR PREPARED TO DO ANYTHING TO END YOUR LIFE?: NO
1. IN THE PAST MONTH, HAVE YOU WISHED YOU WERE DEAD OR WISHED YOU COULD GO TO SLEEP AND NOT WAKE UP?: NO
2. HAVE YOU ACTUALLY HAD ANY THOUGHTS OF KILLING YOURSELF?: NO

## 2024-10-07 ASSESSMENT — PAIN SCALES - GENERAL: PAINLEVEL: 0-NO PAIN

## 2024-10-07 NOTE — PROGRESS NOTES
Subjective   Patient ID: Jl Brand is a 88 y.o. male who presents for No chief complaint on file..    HPI  10/7/2024: Patient following up for his ears. He is accompanied by his daughter. On 8/26, there were some fungal elements. He used Nystatin powder. They still feel clogged and itchy at times.  No pain.    Patient Active Problem List   Diagnosis    Chronic kidney disease, stage 3b (Multi)    Gout    Hyperglycemia    Low vitamin D level    Malignant neoplasm of prostate (Multi)    Otomycosis    Overactive bladder    Thyroid disorder    Obesity, morbid (Multi)    Obesity (BMI 30-39.9)    Primary osteoarthritis of left knee    Primary osteoarthritis of right knee    Other specified glaucoma    Essential hypertension    HLD (hyperlipidemia)    Bradycardia    Sinus node dysfunction (Multi)     Past Surgical History:   Procedure Laterality Date    CATARACT EXTRACTION  2015    COLONOSCOPY  2017    TOTAL KNEE ARTHROPLASTY Right 2021     Review of Systems    All other systems have been reviewed and are negative for complaints except for those mentioned in history of present illness, past medical history and problem list.    Objective   Physical Exam    Right Ear: External inspection of ear with no deformity, scars, or masses. EAC is impacted with cerumen, TM not visible.     Left Ear: External inspection of ear with no deformity, scars, or masses. EAC is impacted with cerumen, TM not visible.     Procedure: Cerumen Removal  Indication: Cerumen Impaction  Risks, benefits, alternatives, and expectations discussed with patient and patient wishes to proceed.    Bilateral canals with cerumen impaction.  Using the microscope, suction, and alligator forceps, large amounts of soft brown cerumen removed bilaterally. TMs intact. No effusions or retractions noted.  Patient tolerated procedure well.      Assessment/Plan   Diagnoses and all orders for this visit:  Sensation of plugged ear on both sides  Bilateral impacted  cerumen  Ear itching  Ears were successfully cleaned.  We discussed administering Boric acid powder once weekly to help keep his ears dry. Patient has audiogram later today. Follow up in 3 months.   All questions answered to patient satisfaction.     LINCOLN Middleton 10/07/24 10:52 AM

## 2024-10-07 NOTE — PROGRESS NOTES
"AUDIOLOGY ADULT AUDIOMETRIC EVALUATION      Name:  Jl Brand  :  1935  Age:  88 y.o.  Date of Evaluation:  10/7/2024    HISTORY  Reason for visit:  hearing loss  Mr. Brand is seen 10/7/2024 at the request of Sahara Madsen CNP  for an evaluation of hearing.      Chief complaint:     hearing loss    Hearing loss:  hearing seems to have worsened since previous audiogram of 2022  Tinnitus:   denies  Otitis Media:  history of fungal infection in both ears  Otologic surgical history:  denies  Dizziness/imbalance:  occasional unsteadiness   Otalgia:  denies  Ear pressure/fullness:  denies  History of excessive noise exposure:  yes,   Other: none         EVALUATION  Please find audiogram in \"Media\" tab (Document Type:  Audiology Report) or included at the bottom of this note.    RESULTS   Otoscopic Evaluation: clear canals bilaterally       Immittance Measures (226 Hz probe tone):     Right ear:  Tympanometry is consistent with normal middle ear pressure and normal tympanic membrane mobility.     Left ear: Could not test (seal)    Test technique:  standard behavioral technique via insert earphones.  Reliability is good.    Pure Tone Audiometry:    Right ear:  Hearing sensitivity is in the mild to moderately-severe hearing loss range.  Left ear: Hearing sensitivity is in the mild to severe hearing loss range.   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   Patient is expected to benefit from devices that provide amplification " (e.g., hearing aids) and improve the desired sound signal over that of background noise as well as from effective communication strategies.      RECOMMENDATIONS  Continue with medical follow-up with Sahara Madsen CNP.  Hearing Aid Evaluation with an audiologist to discuss hearing technology (such as hearing aids) and services.  Reassess hearing in 1 year (or sooner if medically indicated or if there is a concern for a change in hearing).    Continue with medical follow-up as indicated.       PATIENT EDUCATION  Discussed results and recommendations with patient.  Questions were addressed and the patient was encouraged to contact our department should concerns arise.       NORBERTO Samuels, CCC-A  Licensed Audiologist

## 2024-10-09 ENCOUNTER — LAB (OUTPATIENT)
Dept: LAB | Facility: LAB | Age: 89
End: 2024-10-09
Payer: MEDICARE

## 2024-10-09 DIAGNOSIS — E83.52 HYPERCALCEMIA: ICD-10-CM

## 2024-10-09 DIAGNOSIS — R00.1 SINUS BRADYCARDIA: ICD-10-CM

## 2024-10-09 DIAGNOSIS — N18.32 CHRONIC KIDNEY DISEASE, STAGE 3B (MULTI): Primary | ICD-10-CM

## 2024-10-09 DIAGNOSIS — E78.5 HYPERLIPIDEMIA, UNSPECIFIED HYPERLIPIDEMIA TYPE: ICD-10-CM

## 2024-10-09 DIAGNOSIS — I49.5 SINUS NODE DYSFUNCTION (MULTI): ICD-10-CM

## 2024-10-09 LAB
ALBUMIN SERPL BCP-MCNC: 4 G/DL (ref 3.4–5)
ANION GAP SERPL CALC-SCNC: 14 MMOL/L (ref 10–20)
APPEARANCE UR: CLEAR
BILIRUB UR STRIP.AUTO-MCNC: NEGATIVE MG/DL
BUN SERPL-MCNC: 28 MG/DL (ref 6–23)
CALCIUM SERPL-MCNC: 10.3 MG/DL (ref 8.6–10.3)
CHLORIDE SERPL-SCNC: 106 MMOL/L (ref 98–107)
CO2 SERPL-SCNC: 26 MMOL/L (ref 21–32)
COLOR UR: ABNORMAL
CREAT SERPL-MCNC: 1.31 MG/DL (ref 0.5–1.3)
CREAT UR-MCNC: 85.7 MG/DL (ref 20–370)
EGFRCR SERPLBLD CKD-EPI 2021: 53 ML/MIN/1.73M*2
ERYTHROCYTE [DISTWIDTH] IN BLOOD BY AUTOMATED COUNT: 15.6 % (ref 11.5–14.5)
GLUCOSE SERPL-MCNC: 132 MG/DL (ref 74–99)
GLUCOSE UR STRIP.AUTO-MCNC: NORMAL MG/DL
HCT VFR BLD AUTO: 41.5 % (ref 41–52)
HGB BLD-MCNC: 13.3 G/DL (ref 13.5–17.5)
KETONES UR STRIP.AUTO-MCNC: NEGATIVE MG/DL
LEUKOCYTE ESTERASE UR QL STRIP.AUTO: NEGATIVE
MAGNESIUM SERPL-MCNC: 2.02 MG/DL (ref 1.6–2.4)
MCH RBC QN AUTO: 29.6 PG (ref 26–34)
MCHC RBC AUTO-ENTMCNC: 32 G/DL (ref 32–36)
MCV RBC AUTO: 92 FL (ref 80–100)
MUCOUS THREADS #/AREA URNS AUTO: NORMAL /LPF
NITRITE UR QL STRIP.AUTO: NEGATIVE
NRBC BLD-RTO: 0 /100 WBCS (ref 0–0)
PH UR STRIP.AUTO: 6 [PH]
PHOSPHATE SERPL-MCNC: 3.4 MG/DL (ref 2.5–4.9)
PLATELET # BLD AUTO: 198 X10*3/UL (ref 150–450)
POTASSIUM SERPL-SCNC: 3.5 MMOL/L (ref 3.5–5.3)
PROT UR STRIP.AUTO-MCNC: ABNORMAL MG/DL
PROT UR-ACNC: 150 MG/DL (ref 5–25)
PROT/CREAT UR: 1.75 MG/MG CREAT (ref 0–0.17)
RBC # BLD AUTO: 4.49 X10*6/UL (ref 4.5–5.9)
RBC # UR STRIP.AUTO: NEGATIVE /UL
RBC #/AREA URNS AUTO: NORMAL /HPF
SODIUM SERPL-SCNC: 142 MMOL/L (ref 136–145)
SP GR UR STRIP.AUTO: 1.02
UROBILINOGEN UR STRIP.AUTO-MCNC: NORMAL MG/DL
WBC # BLD AUTO: 6.6 X10*3/UL (ref 4.4–11.3)
WBC #/AREA URNS AUTO: NORMAL /HPF

## 2024-10-09 PROCEDURE — 82570 ASSAY OF URINE CREATININE: CPT

## 2024-10-09 PROCEDURE — 83735 ASSAY OF MAGNESIUM: CPT

## 2024-10-09 PROCEDURE — 84156 ASSAY OF PROTEIN URINE: CPT

## 2024-10-09 PROCEDURE — 85027 COMPLETE CBC AUTOMATED: CPT

## 2024-10-09 PROCEDURE — 83970 ASSAY OF PARATHORMONE: CPT

## 2024-10-09 PROCEDURE — 36415 COLL VENOUS BLD VENIPUNCTURE: CPT

## 2024-10-09 PROCEDURE — 81001 URINALYSIS AUTO W/SCOPE: CPT

## 2024-10-09 PROCEDURE — 80069 RENAL FUNCTION PANEL: CPT

## 2024-10-10 LAB — PTH-INTACT SERPL-MCNC: 72.5 PG/ML (ref 18.5–88)

## 2024-10-30 ENCOUNTER — APPOINTMENT (OUTPATIENT)
Dept: CARDIOLOGY | Facility: CLINIC | Age: 89
End: 2024-10-30
Payer: MEDICARE

## 2024-10-30 VITALS
DIASTOLIC BLOOD PRESSURE: 80 MMHG | BODY MASS INDEX: 36.76 KG/M2 | HEIGHT: 70 IN | HEART RATE: 62 BPM | SYSTOLIC BLOOD PRESSURE: 138 MMHG | WEIGHT: 256.8 LBS

## 2024-10-30 DIAGNOSIS — I10 ESSENTIAL HYPERTENSION: Primary | ICD-10-CM

## 2024-10-30 DIAGNOSIS — R00.1 BRADYCARDIA: ICD-10-CM

## 2024-10-30 DIAGNOSIS — E78.5 HYPERLIPIDEMIA, UNSPECIFIED HYPERLIPIDEMIA TYPE: ICD-10-CM

## 2024-10-30 DIAGNOSIS — R00.1 SINUS BRADYCARDIA: ICD-10-CM

## 2024-10-30 DIAGNOSIS — I49.5 SINUS NODE DYSFUNCTION (MULTI): ICD-10-CM

## 2024-10-30 PROCEDURE — 99213 OFFICE O/P EST LOW 20 MIN: CPT | Performed by: NURSE PRACTITIONER

## 2024-10-30 PROCEDURE — 1036F TOBACCO NON-USER: CPT | Performed by: NURSE PRACTITIONER

## 2024-10-30 PROCEDURE — 3079F DIAST BP 80-89 MM HG: CPT | Performed by: NURSE PRACTITIONER

## 2024-10-30 PROCEDURE — 1160F RVW MEDS BY RX/DR IN RCRD: CPT | Performed by: NURSE PRACTITIONER

## 2024-10-30 PROCEDURE — 3075F SYST BP GE 130 - 139MM HG: CPT | Performed by: NURSE PRACTITIONER

## 2024-10-30 PROCEDURE — 1157F ADVNC CARE PLAN IN RCRD: CPT | Performed by: NURSE PRACTITIONER

## 2024-10-30 PROCEDURE — 1159F MED LIST DOCD IN RCRD: CPT | Performed by: NURSE PRACTITIONER

## 2024-12-31 DIAGNOSIS — E07.9 THYROID DISORDER: ICD-10-CM

## 2025-01-01 DIAGNOSIS — I10 ESSENTIAL HYPERTENSION: ICD-10-CM

## 2025-01-02 RX ORDER — HYDROCHLOROTHIAZIDE 25 MG/1
25 TABLET ORAL
Qty: 90 TABLET | Refills: 3 | Status: SHIPPED | OUTPATIENT
Start: 2025-01-02

## 2025-01-02 RX ORDER — LEVOTHYROXINE SODIUM 50 UG/1
TABLET ORAL
Qty: 90 TABLET | Refills: 3 | Status: SHIPPED | OUTPATIENT
Start: 2025-01-02

## 2025-01-06 DIAGNOSIS — E07.9 THYROID DISORDER: ICD-10-CM

## 2025-01-06 DIAGNOSIS — E78.5 HYPERLIPIDEMIA, UNSPECIFIED HYPERLIPIDEMIA TYPE: ICD-10-CM

## 2025-01-06 DIAGNOSIS — I10 ESSENTIAL HYPERTENSION: ICD-10-CM

## 2025-01-06 RX ORDER — ATORVASTATIN CALCIUM 20 MG/1
20 TABLET, FILM COATED ORAL DAILY
Qty: 90 TABLET | Refills: 3 | Status: SHIPPED | OUTPATIENT
Start: 2025-01-06

## 2025-01-06 RX ORDER — AMLODIPINE BESYLATE 5 MG/1
5 TABLET ORAL 2 TIMES DAILY
Qty: 180 TABLET | Refills: 0 | Status: SHIPPED | OUTPATIENT
Start: 2025-01-06

## 2025-01-06 RX ORDER — LOSARTAN POTASSIUM 100 MG/1
100 TABLET ORAL DAILY
Qty: 90 TABLET | Refills: 3 | Status: SHIPPED | OUTPATIENT
Start: 2025-01-06

## 2025-01-06 RX ORDER — LEVOTHYROXINE SODIUM 50 UG/1
50 TABLET ORAL DAILY
Qty: 90 TABLET | Refills: 3 | Status: SHIPPED | OUTPATIENT
Start: 2025-01-06

## 2025-01-10 ENCOUNTER — TELEPHONE (OUTPATIENT)
Dept: PRIMARY CARE | Facility: CLINIC | Age: 89
End: 2025-01-10

## 2025-01-10 ENCOUNTER — LAB (OUTPATIENT)
Dept: LAB | Facility: LAB | Age: 89
End: 2025-01-10
Payer: MEDICARE

## 2025-01-10 DIAGNOSIS — N18.32 CHRONIC KIDNEY DISEASE, STAGE 3B (MULTI): ICD-10-CM

## 2025-01-10 DIAGNOSIS — I10 ESSENTIAL HYPERTENSION: ICD-10-CM

## 2025-01-10 DIAGNOSIS — C61 MALIGNANT NEOPLASM OF PROSTATE (MULTI): ICD-10-CM

## 2025-01-10 DIAGNOSIS — E78.2 MIXED HYPERLIPIDEMIA: ICD-10-CM

## 2025-01-10 DIAGNOSIS — E07.9 THYROID DISORDER: ICD-10-CM

## 2025-01-10 DIAGNOSIS — R73.9 HYPERGLYCEMIA: ICD-10-CM

## 2025-01-10 NOTE — TELEPHONE ENCOUNTER
Patient has apt on 01/17/25 and wants to know if labs are needed. If so, please place orders and notify daughter when available.

## 2025-01-13 ENCOUNTER — OFFICE VISIT (OUTPATIENT)
Dept: OTOLARYNGOLOGY | Facility: CLINIC | Age: 89
End: 2025-01-13
Payer: MEDICARE

## 2025-01-13 VITALS
HEIGHT: 70 IN | BODY MASS INDEX: 36.22 KG/M2 | WEIGHT: 253 LBS | HEART RATE: 60 BPM | DIASTOLIC BLOOD PRESSURE: 83 MMHG | TEMPERATURE: 97.7 F | SYSTOLIC BLOOD PRESSURE: 156 MMHG

## 2025-01-13 DIAGNOSIS — L29.9 EAR ITCHING: ICD-10-CM

## 2025-01-13 DIAGNOSIS — H61.23 BILATERAL IMPACTED CERUMEN: Primary | ICD-10-CM

## 2025-01-13 DIAGNOSIS — H90.3 SENSORINEURAL HEARING LOSS (SNHL) OF BOTH EARS: ICD-10-CM

## 2025-01-13 DIAGNOSIS — H93.8X3 SENSATION OF PLUGGED EAR ON BOTH SIDES: ICD-10-CM

## 2025-01-13 PROCEDURE — 69210 REMOVE IMPACTED EAR WAX UNI: CPT | Mod: 50 | Performed by: NURSE PRACTITIONER

## 2025-01-13 PROCEDURE — 99212 OFFICE O/P EST SF 10 MIN: CPT | Performed by: NURSE PRACTITIONER

## 2025-01-13 PROCEDURE — 69210 REMOVE IMPACTED EAR WAX UNI: CPT | Performed by: NURSE PRACTITIONER

## 2025-01-13 PROCEDURE — 1159F MED LIST DOCD IN RCRD: CPT | Performed by: NURSE PRACTITIONER

## 2025-01-13 PROCEDURE — 99212 OFFICE O/P EST SF 10 MIN: CPT | Mod: 25 | Performed by: NURSE PRACTITIONER

## 2025-01-13 PROCEDURE — 1126F AMNT PAIN NOTED NONE PRSNT: CPT | Performed by: NURSE PRACTITIONER

## 2025-01-13 PROCEDURE — 3077F SYST BP >= 140 MM HG: CPT | Performed by: NURSE PRACTITIONER

## 2025-01-13 PROCEDURE — 3079F DIAST BP 80-89 MM HG: CPT | Performed by: NURSE PRACTITIONER

## 2025-01-13 PROCEDURE — 1036F TOBACCO NON-USER: CPT | Performed by: NURSE PRACTITIONER

## 2025-01-13 PROCEDURE — 1157F ADVNC CARE PLAN IN RCRD: CPT | Performed by: NURSE PRACTITIONER

## 2025-01-13 ASSESSMENT — ENCOUNTER SYMPTOMS
LOSS OF SENSATION IN FEET: 0
OCCASIONAL FEELINGS OF UNSTEADINESS: 0
DEPRESSION: 0

## 2025-01-13 ASSESSMENT — PAIN SCALES - GENERAL: PAINLEVEL_OUTOF10: 0-NO PAIN

## 2025-01-13 NOTE — PROGRESS NOTES
Subjective   Patient ID: Jl Brand is a 89 y.o. male who presents for ear cleaning.    HPI  Patient here for his ears. Last visit was 10/7/2024. Has been using the Boric acid once weekly. Doing pretty good, but still itching.     Patient Active Problem List   Diagnosis    Chronic kidney disease, stage 3b (Multi)    Gout    Hyperglycemia    Low vitamin D level    Malignant neoplasm of prostate (Multi)    Otomycosis    Overactive bladder    Thyroid disorder    Obesity, morbid (Multi)    Obesity (BMI 30-39.9)    Primary osteoarthritis of left knee    Primary osteoarthritis of right knee    Other specified glaucoma    Essential hypertension    HLD (hyperlipidemia)    Bradycardia    Sinus node dysfunction (Multi)     Past Surgical History:   Procedure Laterality Date    CATARACT EXTRACTION  2015    COLONOSCOPY  2017    TOTAL KNEE ARTHROPLASTY Right 2021     Review of Systems    All other systems have been reviewed and are negative for complaints except for those mentioned in history of present illness, past medical history and problem list.    Objective   Physical Exam    Right Ear: External inspection of ear with no deformity, scars, or masses. EAC is impacted with cerumen, TM not visible.     Left Ear: External inspection of ear with no deformity, scars, or masses. EAC is impacted with cerumen, TM not visible.     Procedure: Cerumen Removal  Indication: Cerumen Impaction  Risks, benefits, alternatives, and expectations discussed with patient and patient wishes to proceed.    Bilateral canals with cerumen impaction.  Using the microscope and suction, large amounts of soft  yellow removed bilaterally. TMs intact. No effusions or retractions noted.  Patient tolerated procedure well.      Diagnostic Results           Assessment/Plan   Diagnoses and all orders for this visit:  Sensation of plugged ear on both sides  Bilateral impacted cerumen  Ear itching  Ears were successfully cleaned.  Continue administering Boric  acid powder once weekly to help keep his ears dry.   All questions answered to patient satisfaction.     Sahara Madsen, DELFINO-CNP 01/13/25 11:07 AM

## 2025-01-17 ENCOUNTER — LAB (OUTPATIENT)
Dept: LAB | Facility: LAB | Age: 89
End: 2025-01-17
Payer: MEDICARE

## 2025-01-17 ENCOUNTER — APPOINTMENT (OUTPATIENT)
Dept: PRIMARY CARE | Facility: CLINIC | Age: 89
End: 2025-01-17
Payer: MEDICARE

## 2025-01-17 VITALS
HEART RATE: 64 BPM | DIASTOLIC BLOOD PRESSURE: 62 MMHG | BODY MASS INDEX: 36.08 KG/M2 | RESPIRATION RATE: 18 BRPM | WEIGHT: 252 LBS | SYSTOLIC BLOOD PRESSURE: 124 MMHG | OXYGEN SATURATION: 98 % | TEMPERATURE: 97.4 F | HEIGHT: 70 IN

## 2025-01-17 DIAGNOSIS — R73.9 HYPERGLYCEMIA: ICD-10-CM

## 2025-01-17 DIAGNOSIS — C61 MALIGNANT NEOPLASM OF PROSTATE (MULTI): ICD-10-CM

## 2025-01-17 DIAGNOSIS — I49.5 SINUS NODE DYSFUNCTION (MULTI): ICD-10-CM

## 2025-01-17 DIAGNOSIS — N18.32 CHRONIC KIDNEY DISEASE, STAGE 3B (MULTI): ICD-10-CM

## 2025-01-17 DIAGNOSIS — E66.01 OBESITY, MORBID (MULTI): ICD-10-CM

## 2025-01-17 DIAGNOSIS — E78.2 MIXED HYPERLIPIDEMIA: ICD-10-CM

## 2025-01-17 DIAGNOSIS — I10 ESSENTIAL HYPERTENSION: ICD-10-CM

## 2025-01-17 DIAGNOSIS — R73.03 PREDIABETES: Primary | ICD-10-CM

## 2025-01-17 DIAGNOSIS — E07.9 THYROID DISORDER: ICD-10-CM

## 2025-01-17 LAB
ALBUMIN SERPL BCP-MCNC: 4.3 G/DL (ref 3.4–5)
ALP SERPL-CCNC: 72 U/L (ref 33–136)
ALT SERPL W P-5'-P-CCNC: 18 U/L (ref 10–52)
ANION GAP SERPL CALC-SCNC: 12 MMOL/L (ref 10–20)
AST SERPL W P-5'-P-CCNC: 13 U/L (ref 9–39)
BASOPHILS # BLD AUTO: 0.05 X10*3/UL (ref 0–0.1)
BASOPHILS NFR BLD AUTO: 0.7 %
BILIRUB SERPL-MCNC: 0.7 MG/DL (ref 0–1.2)
BUN SERPL-MCNC: 33 MG/DL (ref 6–23)
CALCIUM SERPL-MCNC: 10.1 MG/DL (ref 8.6–10.3)
CHLORIDE SERPL-SCNC: 106 MMOL/L (ref 98–107)
CHOLEST SERPL-MCNC: 123 MG/DL (ref 0–199)
CHOLESTEROL/HDL RATIO: 3.1
CO2 SERPL-SCNC: 27 MMOL/L (ref 21–32)
CREAT SERPL-MCNC: 1.52 MG/DL (ref 0.5–1.3)
EGFRCR SERPLBLD CKD-EPI 2021: 44 ML/MIN/1.73M*2
EOSINOPHIL # BLD AUTO: 0.18 X10*3/UL (ref 0–0.4)
EOSINOPHIL NFR BLD AUTO: 2.4 %
ERYTHROCYTE [DISTWIDTH] IN BLOOD BY AUTOMATED COUNT: 14.6 % (ref 11.5–14.5)
GLUCOSE SERPL-MCNC: 91 MG/DL (ref 74–99)
HCT VFR BLD AUTO: 41.9 % (ref 41–52)
HDLC SERPL-MCNC: 40.1 MG/DL
HGB BLD-MCNC: 13.9 G/DL (ref 13.5–17.5)
IMM GRANULOCYTES # BLD AUTO: 0.03 X10*3/UL (ref 0–0.5)
IMM GRANULOCYTES NFR BLD AUTO: 0.4 % (ref 0–0.9)
LDLC SERPL CALC-MCNC: 62 MG/DL
LYMPHOCYTES # BLD AUTO: 1.78 X10*3/UL (ref 0.8–3)
LYMPHOCYTES NFR BLD AUTO: 24.2 %
MCH RBC QN AUTO: 29.9 PG (ref 26–34)
MCHC RBC AUTO-ENTMCNC: 33.2 G/DL (ref 32–36)
MCV RBC AUTO: 90 FL (ref 80–100)
MONOCYTES # BLD AUTO: 0.62 X10*3/UL (ref 0.05–0.8)
MONOCYTES NFR BLD AUTO: 8.4 %
NEUTROPHILS # BLD AUTO: 4.7 X10*3/UL (ref 1.6–5.5)
NEUTROPHILS NFR BLD AUTO: 63.9 %
NON HDL CHOLESTEROL: 83 MG/DL (ref 0–149)
NRBC BLD-RTO: 0 /100 WBCS (ref 0–0)
PLATELET # BLD AUTO: 232 X10*3/UL (ref 150–450)
POTASSIUM SERPL-SCNC: 3.8 MMOL/L (ref 3.5–5.3)
PROT SERPL-MCNC: 7.2 G/DL (ref 6.4–8.2)
PSA SERPL-MCNC: <0.01 NG/ML
RBC # BLD AUTO: 4.65 X10*6/UL (ref 4.5–5.9)
SODIUM SERPL-SCNC: 141 MMOL/L (ref 136–145)
T4 FREE SERPL-MCNC: 0.83 NG/DL (ref 0.61–1.12)
TRIGL SERPL-MCNC: 106 MG/DL (ref 0–149)
TSH SERPL-ACNC: 1.98 MIU/L (ref 0.44–3.98)
VLDL: 21 MG/DL (ref 0–40)
WBC # BLD AUTO: 7.4 X10*3/UL (ref 4.4–11.3)

## 2025-01-17 PROCEDURE — 1157F ADVNC CARE PLAN IN RCRD: CPT | Performed by: FAMILY MEDICINE

## 2025-01-17 PROCEDURE — 84443 ASSAY THYROID STIM HORMONE: CPT

## 2025-01-17 PROCEDURE — 85025 COMPLETE CBC W/AUTO DIFF WBC: CPT

## 2025-01-17 PROCEDURE — 80061 LIPID PANEL: CPT

## 2025-01-17 PROCEDURE — 83036 HEMOGLOBIN GLYCOSYLATED A1C: CPT

## 2025-01-17 PROCEDURE — 3078F DIAST BP <80 MM HG: CPT | Performed by: FAMILY MEDICINE

## 2025-01-17 PROCEDURE — 84439 ASSAY OF FREE THYROXINE: CPT

## 2025-01-17 PROCEDURE — 99214 OFFICE O/P EST MOD 30 MIN: CPT | Performed by: FAMILY MEDICINE

## 2025-01-17 PROCEDURE — 1036F TOBACCO NON-USER: CPT | Performed by: FAMILY MEDICINE

## 2025-01-17 PROCEDURE — 3074F SYST BP LT 130 MM HG: CPT | Performed by: FAMILY MEDICINE

## 2025-01-17 PROCEDURE — 80053 COMPREHEN METABOLIC PANEL: CPT

## 2025-01-17 PROCEDURE — 84153 ASSAY OF PSA TOTAL: CPT

## 2025-01-17 RX ORDER — AMLODIPINE BESYLATE 5 MG/1
5 TABLET ORAL 2 TIMES DAILY
Qty: 180 TABLET | Refills: 0 | Status: SHIPPED | OUTPATIENT
Start: 2025-01-17

## 2025-01-17 NOTE — PROGRESS NOTES
"Subjective   Patient ID: Jl Brand is a 89 y.o. male who presents for Annual Exam (NOT AWV), Hypertension, and Hyperlipidemia.  Due for labs (orders in chart)     C19: utd  Flu: utd  Pneumo: utd   Tdap: utd  Shingrix: utd  RSV: utd  ColoRect: UTD  AWV: 7/2024  Steadi: POS offered PT  HPI  Patient Active Problem List   Diagnosis    Chronic kidney disease, stage 3b (Multi)    Gout    Hyperglycemia    Low vitamin D level    Malignant neoplasm of prostate (Multi)    Otomycosis    Overactive bladder    Thyroid disorder    Obesity, morbid (Multi)    Obesity (BMI 30-39.9)    Primary osteoarthritis of left knee    Primary osteoarthritis of right knee    Other specified glaucoma    Essential hypertension    HLD (hyperlipidemia)    Bradycardia    Sinus node dysfunction (Multi)       Past Surgical History:   Procedure Laterality Date    CATARACT EXTRACTION  2015    COLONOSCOPY  2017    TOTAL KNEE ARTHROPLASTY Right 2021       Review of Systems  This patient has  NO history of seizures/ CAD or CVA    NO history of recent Covid nor flu symptoms,  NO Fever nor chills,  NO Chest pain, shortness of breath nor paroxysmal nocturnal dyspnea,  NO Nausea, vomiting, nor diarrhea,  NO Hematochezia nor melena,  NO Dysuria, hematuria, nor new incontinence issues  NO new severe headaches nor neurological complaints,  NO new issues with anxiety nor depression nor new psychiatric complaints,  NO suicidal nor homicidal ideations.     OBJECTIVE:  /62   Pulse 64   Temp 36.3 °C (97.4 °F) (Temporal)   Resp 18   Ht 1.778 m (5' 10\")   Wt 114 kg (252 lb)   SpO2 98%   BMI 36.16 kg/m²      General:  alert, oriented, no acute distress.  No obvious skin rashes noted.   No gait disturbance noted.    Mood is pleasant,  no signs of emotional distress.   Not appearing intoxicated or altered.   No voiced delusions,   Normal, appropriate behavior.    HEENT: Normocephalic, atraumatic,   Pupils round, reactive to light  Extraocular motions " intact and wnl  Tympanic membranes normal    Neck: no nuchal rigidity  No masses palpable.  No carotid bruits.  No thyromegaly.    Respiratory: Equal breath sounds  No wheezes,    rales,    nor rhonchi  No respiratory distress.    Heart: Regular rate and rhythm, no    murmurs  no rubs/gallops    Abdomen: no masses palpable, nontender, no rebound nor guarding.    Extremities: NO cyanosis noted, no clubbing.   Trace  edema noted.  2+dorsalis pedis pulses.    Normal-not antalgic, steady gait.    No visits with results within 3 Month(s) from this visit.   Latest known visit with results is:   Lab on 10/09/2024   Component Date Value Ref Range Status    Color, Urine 10/09/2024 Light-Yellow  Light-Yellow, Yellow, Dark-Yellow Final    Appearance, Urine 10/09/2024 Clear  Clear Final    Specific Gravity, Urine 10/09/2024 1.016  1.005 - 1.035 Final    pH, Urine 10/09/2024 6.0  5.0, 5.5, 6.0, 6.5, 7.0, 7.5, 8.0 Final    Protein, Urine 10/09/2024 100 (2+) (A)  NEGATIVE, 10 (TRACE), 20 (TRACE) mg/dL Final    Glucose, Urine 10/09/2024 Normal  Normal mg/dL Final    Blood, Urine 10/09/2024 NEGATIVE  NEGATIVE Final    Ketones, Urine 10/09/2024 NEGATIVE  NEGATIVE mg/dL Final    Bilirubin, Urine 10/09/2024 NEGATIVE  NEGATIVE Final    Urobilinogen, Urine 10/09/2024 Normal  Normal mg/dL Final    Nitrite, Urine 10/09/2024 NEGATIVE  NEGATIVE Final    Leukocyte Esterase, Urine 10/09/2024 NEGATIVE  NEGATIVE Final    Magnesium 10/09/2024 2.02  1.60 - 2.40 mg/dL Final    Glucose 10/09/2024 132 (H)  74 - 99 mg/dL Final    Sodium 10/09/2024 142  136 - 145 mmol/L Final    Potassium 10/09/2024 3.5  3.5 - 5.3 mmol/L Final    Chloride 10/09/2024 106  98 - 107 mmol/L Final    Bicarbonate 10/09/2024 26  21 - 32 mmol/L Final    Anion Gap 10/09/2024 14  10 - 20 mmol/L Final    Urea Nitrogen 10/09/2024 28 (H)  6 - 23 mg/dL Final    Creatinine 10/09/2024 1.31 (H)  0.50 - 1.30 mg/dL Final    eGFR 10/09/2024 53 (L)  >60 mL/min/1.73m*2 Final    Calculations  of estimated GFR are performed using the 2021 CKD-EPI Study Refit equation without the race variable for the IDMS-Traceable creatinine methods.  https://jasn.asnjournals.org/content/early/2021/09/22/ASN.4304272948    Calcium 10/09/2024 10.3  8.6 - 10.3 mg/dL Final    Phosphorus 10/09/2024 3.4  2.5 - 4.9 mg/dL Final    The performance characteristics of phosphorus testing in heparinized plasma have been validated by the individual  laboratory site where testing is performed. Testing on heparinized plasma is not approved by the FDA; however, such approval is not necessary.    Albumin 10/09/2024 4.0  3.4 - 5.0 g/dL Final    Total Protein, Urine Random 10/09/2024 150 (H)  5 - 25 mg/dL Final    Creatinine, Urine Random 10/09/2024 85.7  20.0 - 370.0 mg/dL Final    T. Protein/Creatinine Ratio 10/09/2024 1.75 (H)  0.00 - 0.17 mg/mg Creat Final    WBC 10/09/2024 6.6  4.4 - 11.3 x10*3/uL Final    nRBC 10/09/2024 0.0  0.0 - 0.0 /100 WBCs Final    RBC 10/09/2024 4.49 (L)  4.50 - 5.90 x10*6/uL Final    Hemoglobin 10/09/2024 13.3 (L)  13.5 - 17.5 g/dL Final    Hematocrit 10/09/2024 41.5  41.0 - 52.0 % Final    MCV 10/09/2024 92  80 - 100 fL Final    MCH 10/09/2024 29.6  26.0 - 34.0 pg Final    MCHC 10/09/2024 32.0  32.0 - 36.0 g/dL Final    RDW 10/09/2024 15.6 (H)  11.5 - 14.5 % Final    Platelets 10/09/2024 198  150 - 450 x10*3/uL Final    Parathyroid Hormone, Intact 10/09/2024 72.5  18.5 - 88.0 pg/mL Final    WBC, Urine 10/09/2024 NONE  1-5, NONE /HPF Final    RBC, Urine 10/09/2024 1-2  NONE, 1-2, 3-5 /HPF Final    Mucus, Urine 10/09/2024 FEW  Reference range not established. /LPF Final        Assessment/Plan     Problem List Items Addressed This Visit       Chronic kidney disease, stage 3b (Multi)    Malignant neoplasm of prostate (Multi)    Obesity, morbid (Multi)    Essential hypertension    Relevant Medications    amLODIPine (Norvasc) 5 mg tablet    Sinus node dysfunction (Multi)    Relevant Medications    amLODIPine  (Norvasc) 5 mg tablet     Other Visit Diagnoses       Prediabetes    -  Primary            Follow up at next scheduled visit -as planned    See NEPHROLOGY  Sees CARDIO  See me 6mo  Labs today  The patient is aware that results will be forthcoming of ALL planned labs and or tests. If no results are received on my chart or by letter within 1 - 3 weeks, the patient is aware they need to call to obtain results, as this is not usual. Also, if any new conditions arise, or current condition worsens, it is understood that sooner appointment should be made or urgent care/convenient care or emergency room treatment should be sought depending on severity. Otherwise follow up for recheck at regular intervals as we have discussed, at least yearly.

## 2025-01-17 NOTE — PROGRESS NOTES
Due for labs (orders in chart)    C19: utd  Flu: utd  Pneumo: utd   Tdap: utd  Shingrix: utd  RSV: utd  ColoRect: UTD  AWV: 7/2024  Steadi: POS

## 2025-01-18 LAB
EST. AVERAGE GLUCOSE BLD GHB EST-MCNC: 103 MG/DL
HBA1C MFR BLD: 5.2 %

## 2025-01-29 ENCOUNTER — APPOINTMENT (OUTPATIENT)
Dept: CARDIOLOGY | Facility: CLINIC | Age: 89
End: 2025-01-29
Payer: MEDICARE

## 2025-03-18 ENCOUNTER — APPOINTMENT (OUTPATIENT)
Dept: CARDIOLOGY | Facility: CLINIC | Age: 89
End: 2025-03-18
Payer: MEDICARE

## 2025-03-18 VITALS
SYSTOLIC BLOOD PRESSURE: 134 MMHG | WEIGHT: 252.4 LBS | HEART RATE: 69 BPM | BODY MASS INDEX: 36.22 KG/M2 | DIASTOLIC BLOOD PRESSURE: 70 MMHG

## 2025-03-18 DIAGNOSIS — E78.5 HYPERLIPIDEMIA, UNSPECIFIED HYPERLIPIDEMIA TYPE: ICD-10-CM

## 2025-03-18 DIAGNOSIS — I10 ESSENTIAL HYPERTENSION: ICD-10-CM

## 2025-03-18 DIAGNOSIS — I47.10 PAROXYSMAL SUPRAVENTRICULAR TACHYCARDIA (CMS-HCC): ICD-10-CM

## 2025-03-18 DIAGNOSIS — I49.5 SSS (SICK SINUS SYNDROME) (MULTI): ICD-10-CM

## 2025-03-18 PROCEDURE — 1157F ADVNC CARE PLAN IN RCRD: CPT | Performed by: INTERNAL MEDICINE

## 2025-03-18 PROCEDURE — 99214 OFFICE O/P EST MOD 30 MIN: CPT | Performed by: INTERNAL MEDICINE

## 2025-03-18 PROCEDURE — G2211 COMPLEX E/M VISIT ADD ON: HCPCS | Performed by: INTERNAL MEDICINE

## 2025-03-18 PROCEDURE — 3075F SYST BP GE 130 - 139MM HG: CPT | Performed by: INTERNAL MEDICINE

## 2025-03-18 PROCEDURE — 1159F MED LIST DOCD IN RCRD: CPT | Performed by: INTERNAL MEDICINE

## 2025-03-18 PROCEDURE — 3078F DIAST BP <80 MM HG: CPT | Performed by: INTERNAL MEDICINE

## 2025-03-18 NOTE — PROGRESS NOTES
Patient:  Jl Brand  YOB: 1935  MRN: [id number]       Impression/Plan:     Diagnoses and all orders for this visit:  SSS (sick sinus syndrome) (Multi)  Paroxysmal supraventricular tachycardia (CMS-HCC)  -     No symptomatic recurrent SVT off of beta-blocker.  Heart rate is improved off beta-blocker.  Hyperlipidemia, unspecified hyperlipidemia type  -     Cholesterol is quite well-controlled on statin alone was taken off of niacin and flaxseed and triglycerides remain normal.  Continue current regimen  Essential hypertension  -     Well-controlled without side effect of medication  Reviewed testing in detail with patient      Chief Complaint/Active Symptoms:      Chief Complaint   Patient presents with    Follow-up     3 month check up       Jl Brand is a 89 y.o. male who presents with hypertension, hyperlipidemia prostate cancer, chronic kidney disease, hypothyroidism and sinus node dysfunction with sinus bradycardia.  He has not had history of myocardial infarction, stroke or symptoms of peripheral vascular disease..  2007 normal stress perfusion study done in [place].  Monitoring September 2024 with decreased heart rate variability isolated PACs PVCs rare episodes SVT 6 beats in length no A-fib.     6/25/2024 echocardiogram;  1. The left ventricular systolic function is normal, with a visually estimated ejection fraction of 65%.   2. There is moderate concentric left ventricular hypertrophy.   3. There is normal right ventricular global systolic function.   4. Normal chamber sizes.   5. No significant valvular heart disease.     I had seen him last 10/3/2024 tolerating Lipitor for his hyperlipidemia well without side effect.  He was taken off of niacin and flaxseed.  No evidence of significant vascular disease and his aspirin was discontinued.  Does have sinus node dysfunction occasional SVT he was maintained off of beta-blocker unless he were to have symptoms of increased rates.  Blood  pressure is reasonably controlled.    Seen again by Soumya Garvin NP on 10/30/2024.  Blood pressure was stable at that the office heart rate was 62 he had no cardiovascular symptoms.  4 medications were continued as they were     Lab work 1/17/2025        A1c 5.2 PSA/TSH normal.  Cholesterol 123 HDL 40 LDL 62 triglycerides 106 CMP remarkable for creatinine 1.5 similar to prior studies have ranged from 1.3-1.6 in the last 9 months CBC unremarkable    He denies angina, dyspnea, palpitation, edema, lightheadedness or syncope.  He has had no symptoms of claudication or neurologic deterioration.  There have been no hospitalizations or emergency room visits since last office visit.      Review of Systems: Unremarkable except as noted above    Meds     Current Outpatient Medications   Medication Instructions    amLODIPine (NORVASC) 5 mg, oral, 2 times daily    atorvastatin (LIPITOR) 20 mg, oral, Daily    cholecalciferol (Vitamin D-3) 50 mcg (2,000 unit) capsule Take by mouth.    hydrALAZINE (Apresoline) 100 mg tablet 1 FULL TABLET TWICE A DAY    hydroCHLOROthiazide (HYDRODIURIL) 25 mg, oral, Daily before breakfast    levothyroxine (SYNTHROID, LEVOXYL) 50 mcg, oral, Daily, Take on an empty stomach at the same time each day, either 30 to 60 minutes prior to breakfast    losartan (COZAAR) 100 mg, oral, Daily    multivitamin tablet Take by mouth.    timolol (Timoptic) 0.25 % ophthalmic solution Administer 1 drop into both eyes 2 times a day.        Allergies     Allergies   Allergen Reactions    Celecoxib Diarrhea    Penicillins Unknown    Sulfa (Sulfonamide Antibiotics) Diarrhea         Annotated Problems     Specialty Problems          Cardiology Problems    Essential hypertension     Last Assessment & Plan: Formatting of this note might be different from the original. Assessment: Managed with med   Date:        BP:    09/10/2021   148/81   Stable.         HLD (hyperlipidemia)     Last Assessment & Plan: Formatting of this  note might be different from the original. Assessment: Managed with med         Bradycardia    Sinus node dysfunction (Multi)        Problem List     Patient Active Problem List    Diagnosis Date Noted    Sinus node dysfunction (Multi) 10/03/2024    Bradycardia 08/09/2024    Obesity, morbid (Multi) 06/14/2023    Chronic kidney disease, stage 3b (Multi) 05/31/2023    Gout 05/31/2023    Hyperglycemia 05/31/2023    Low vitamin D level 05/31/2023    Malignant neoplasm of prostate (Multi) 05/31/2023    Otomycosis 05/31/2023    Overactive bladder 05/31/2023    Thyroid disorder 05/31/2023    Obesity (BMI 30-39.9) 05/20/2021    Other specified glaucoma 05/20/2021    Essential hypertension 05/20/2021    HLD (hyperlipidemia) 05/20/2021    Primary osteoarthritis of left knee 05/12/2021    Primary osteoarthritis of right knee 05/12/2021       Objective:     Vitals:    03/18/25 1526   BP: 134/70   BP Location: Right arm   Patient Position: Sitting   BP Cuff Size: Adult   Pulse: 69   Weight: 114 kg (252 lb 6.4 oz)      Wt Readings from Last 4 Encounters:   03/18/25 114 kg (252 lb 6.4 oz)   01/17/25 114 kg (252 lb)   01/13/25 115 kg (253 lb)   10/30/24 116 kg (256 lb 12.8 oz)           LAB:     Lab Results   Component Value Date    WBC 7.4 01/17/2025    HGB 13.9 01/17/2025    HCT 41.9 01/17/2025     01/17/2025    CHOL 123 01/17/2025    TRIG 106 01/17/2025    HDL 40.1 01/17/2025    ALT 18 01/17/2025    AST 13 01/17/2025     01/17/2025    K 3.8 01/17/2025     01/17/2025    CREATININE 1.52 (H) 01/17/2025    BUN 33 (H) 01/17/2025    CO2 27 01/17/2025    TSH 1.98 01/17/2025    PSA <0.1 03/28/2019    HGBA1C 5.2 01/17/2025         Physical Exam     General Appearance: alert and oriented to person, place and time, in no acute distress  Cardiovascular: normal rate, regular rhythm, normal S1 and S2, no murmurs, rubs, clicks, or gallops,  no JVD  Pulmonary/Chest: clear to auscultation bilaterally- no wheezes, rales or  rhonchi, normal air movement, no respiratory distress  Abdomen: soft, non-tender, non-distended, normal bowel sounds, no masses   Extremities: no cyanosis, clubbing or edema  Skin: warm and dry, no rash or erythema  Eyes: EOMI  Neck: supple and non-tender without mass, no thyromegaly   Neurological: alert, oriented, normal speech, no focal findings or movement disorder noted        Provider attestation-scribe documentation  Any medical record entries made by the scribe were at my discretion and personally dictated by me.  I have reviewed the chart and agree that the record accurately reflects my personal performance of the history, physical exam, discussion and plan.

## 2025-03-18 NOTE — PATIENT INSTRUCTIONS
CONTINUE SAME MEDICATION REGIMEN   START WALKING 15-30 MINUTES PER DAY   FOLLOW UP IN 6 MONTHS     DID YOU KNOW  We have a pharmacy here in the [facility].  They can fill all prescriptions, not just cardiac medications.  Prescriptions from other pharmacies can easily be transferred to the  pharmacy by the  pharmacist on site.   pharmacies offer FREE HOME DELIVERY on medications to anywhere in [place]. They can sync your medications. Typically prescriptions can be ready in 10 - 15 minutes. If pharmacy is unable to fill your  prescription or if cost is more than your paying now the Pharmacist can easily transfer back to your Pharmacy of choice. Pharmacy phone # [phone].     Please bring all medicines, vitamins, and herbal supplements with you in original bottles to every appointment! This is the best way to ensure your medication list in your chart is accurate.    Prescriptions will not be filled unless you are compliant with your follow up appointments or have a follow up appointment scheduled as per instruction of your physician. Refills should be requested at the time of your visit.    Scribe Attestation  By signing my name below, I, Keyonna Gama RN, Scribe   attest that this documentation has been prepared under the direction and in the presence of Dionicio Bella MD.'   0

## 2025-05-22 ENCOUNTER — OFFICE VISIT (OUTPATIENT)
Dept: OTOLARYNGOLOGY | Facility: CLINIC | Age: 89
End: 2025-05-22
Payer: MEDICARE

## 2025-05-22 VITALS
BODY MASS INDEX: 36.36 KG/M2 | WEIGHT: 254 LBS | TEMPERATURE: 97.8 F | HEIGHT: 70 IN | DIASTOLIC BLOOD PRESSURE: 76 MMHG | OXYGEN SATURATION: 97 % | HEART RATE: 61 BPM | SYSTOLIC BLOOD PRESSURE: 148 MMHG

## 2025-05-22 DIAGNOSIS — H61.21 EXCESSIVE CERUMEN IN RIGHT EAR CANAL: Primary | ICD-10-CM

## 2025-05-22 PROCEDURE — 99213 OFFICE O/P EST LOW 20 MIN: CPT | Performed by: NURSE PRACTITIONER

## 2025-05-22 PROCEDURE — 3077F SYST BP >= 140 MM HG: CPT | Performed by: NURSE PRACTITIONER

## 2025-05-22 PROCEDURE — 1126F AMNT PAIN NOTED NONE PRSNT: CPT | Performed by: NURSE PRACTITIONER

## 2025-05-22 PROCEDURE — 1036F TOBACCO NON-USER: CPT | Performed by: NURSE PRACTITIONER

## 2025-05-22 PROCEDURE — 1159F MED LIST DOCD IN RCRD: CPT | Performed by: NURSE PRACTITIONER

## 2025-05-22 PROCEDURE — 3078F DIAST BP <80 MM HG: CPT | Performed by: NURSE PRACTITIONER

## 2025-05-22 ASSESSMENT — ENCOUNTER SYMPTOMS
LOSS OF SENSATION IN FEET: 0
OCCASIONAL FEELINGS OF UNSTEADINESS: 0
DEPRESSION: 0

## 2025-05-22 ASSESSMENT — PAIN SCALES - GENERAL: PAINLEVEL_OUTOF10: 0-NO PAIN

## 2025-05-22 NOTE — PROGRESS NOTES
Subjective   Patient ID: Jl Brand is a 89 y.o. male who presents for Patient is here for an ear cleaning..    HPI  Patient here for his ears. Last visit was 1/13/2025. Has been using the Boric acid once weekly, but hasn't been using it lately. No real complaints with the ears today.    Patient Active Problem List   Diagnosis    Chronic kidney disease, stage 3b (Multi)    Gout    Hyperglycemia    Low vitamin D level    Malignant neoplasm of prostate (Multi)    Otomycosis    Overactive bladder    Thyroid disorder    Obesity, morbid (Multi)    Obesity (BMI 30-39.9)    Primary osteoarthritis of left knee    Primary osteoarthritis of right knee    Other specified glaucoma    Essential hypertension    HLD (hyperlipidemia)    Bradycardia    SSS (sick sinus syndrome) (Multi)    Paroxysmal supraventricular tachycardia     Past Surgical History:   Procedure Laterality Date    CATARACT EXTRACTION  2015    COLONOSCOPY  2017    TOTAL KNEE ARTHROPLASTY Right 2021     Review of Systems    All other systems have been reviewed and are negative for complaints except for those mentioned in history of present illness, past medical history and problem list.    Objective   Physical Exam    Right Ear: External inspection of ear with no deformity, scars, or masses. EAC is with excessive cerumen that was removed using the microscope and suction.  TM is intact with no sign of infection, effusion, or retraction.  No perforation seen.     Left Ear: External inspection of ear with no deformity, scars, or masses. EAC is clear.  TM is intact with no sign of infection, effusion, or retraction.  No perforation seen.      Diagnostic Results           Assessment/Plan   Diagnoses and all orders for this visit:  Excessive cerumen, right  Ear was successfully cleaned.  Continue administering Boric acid powder once every other week to help keep his ears dry.   Follow up in 6 months for ear cleaning, or sooner if needed.  All questions answered to  patient satisfaction.     DELFINO Middleton-CNP 05/22/25 10:19 AM

## 2025-07-11 DIAGNOSIS — N18.31 CKD STAGE G3A/A2, GFR 45-59 AND ALBUMIN CREATININE RATIO 30-299 MG/G (MULTI): ICD-10-CM

## 2025-07-11 DIAGNOSIS — E78.5 HYPERLIPIDEMIA, UNSPECIFIED HYPERLIPIDEMIA TYPE: ICD-10-CM

## 2025-07-11 DIAGNOSIS — R73.9 HYPERGLYCEMIA: ICD-10-CM

## 2025-07-11 DIAGNOSIS — I10 ESSENTIAL HYPERTENSION: ICD-10-CM

## 2025-07-11 DIAGNOSIS — E07.9 THYROID DISORDER: ICD-10-CM

## 2025-07-15 LAB
ALBUMIN SERPL-MCNC: 4.1 G/DL (ref 3.6–5.1)
ALP SERPL-CCNC: 69 U/L (ref 35–144)
ALT SERPL-CCNC: 13 U/L (ref 9–46)
ANION GAP SERPL CALCULATED.4IONS-SCNC: 8 MMOL/L (CALC) (ref 7–17)
AST SERPL-CCNC: 12 U/L (ref 10–35)
BASOPHILS # BLD AUTO: 41 CELLS/UL (ref 0–200)
BASOPHILS NFR BLD AUTO: 0.7 %
BILIRUB SERPL-MCNC: 0.6 MG/DL (ref 0.2–1.2)
BUN SERPL-MCNC: 29 MG/DL (ref 7–25)
CALCIUM SERPL-MCNC: 10.1 MG/DL (ref 8.6–10.3)
CHLORIDE SERPL-SCNC: 106 MMOL/L (ref 98–110)
CHOLEST SERPL-MCNC: 118 MG/DL
CHOLEST/HDLC SERPL: 3 (CALC)
CO2 SERPL-SCNC: 28 MMOL/L (ref 20–32)
CREAT SERPL-MCNC: 1.49 MG/DL (ref 0.7–1.22)
EGFRCR SERPLBLD CKD-EPI 2021: 45 ML/MIN/1.73M2
EOSINOPHIL # BLD AUTO: 142 CELLS/UL (ref 15–500)
EOSINOPHIL NFR BLD AUTO: 2.4 %
ERYTHROCYTE [DISTWIDTH] IN BLOOD BY AUTOMATED COUNT: 14.2 % (ref 11–15)
EST. AVERAGE GLUCOSE BLD GHB EST-MCNC: 111 MG/DL
EST. AVERAGE GLUCOSE BLD GHB EST-SCNC: 6.2 MMOL/L
GLUCOSE SERPL-MCNC: 98 MG/DL (ref 65–99)
HBA1C MFR BLD: 5.5 %
HCT VFR BLD AUTO: 41.9 % (ref 38.5–50)
HDLC SERPL-MCNC: 40 MG/DL
HGB BLD-MCNC: 13.4 G/DL (ref 13.2–17.1)
LDLC SERPL CALC-MCNC: 59 MG/DL (CALC)
LYMPHOCYTES # BLD AUTO: 1617 CELLS/UL (ref 850–3900)
LYMPHOCYTES NFR BLD AUTO: 27.4 %
MCH RBC QN AUTO: 29.3 PG (ref 27–33)
MCHC RBC AUTO-ENTMCNC: 32 G/DL (ref 32–36)
MCV RBC AUTO: 91.5 FL (ref 80–100)
MONOCYTES # BLD AUTO: 507 CELLS/UL (ref 200–950)
MONOCYTES NFR BLD AUTO: 8.6 %
NEUTROPHILS # BLD AUTO: 3593 CELLS/UL (ref 1500–7800)
NEUTROPHILS NFR BLD AUTO: 60.9 %
NONHDLC SERPL-MCNC: 78 MG/DL (CALC)
PLATELET # BLD AUTO: 210 THOUSAND/UL (ref 140–400)
PMV BLD REES-ECKER: 9.4 FL (ref 7.5–12.5)
POTASSIUM SERPL-SCNC: 3.7 MMOL/L (ref 3.5–5.3)
PROT SERPL-MCNC: 7.1 G/DL (ref 6.1–8.1)
RBC # BLD AUTO: 4.58 MILLION/UL (ref 4.2–5.8)
SODIUM SERPL-SCNC: 142 MMOL/L (ref 135–146)
T4 FREE SERPL-MCNC: 1.1 NG/DL (ref 0.8–1.8)
TRIGL SERPL-MCNC: 103 MG/DL
TSH SERPL-ACNC: 1.51 MIU/L (ref 0.4–4.5)
WBC # BLD AUTO: 5.9 THOUSAND/UL (ref 3.8–10.8)

## 2025-07-18 ENCOUNTER — APPOINTMENT (OUTPATIENT)
Dept: PRIMARY CARE | Facility: CLINIC | Age: 89
End: 2025-07-18
Payer: MEDICARE

## 2025-07-18 VITALS
SYSTOLIC BLOOD PRESSURE: 124 MMHG | OXYGEN SATURATION: 96 % | WEIGHT: 249 LBS | TEMPERATURE: 96.4 F | HEART RATE: 64 BPM | BODY MASS INDEX: 35.65 KG/M2 | RESPIRATION RATE: 16 BRPM | HEIGHT: 70 IN | DIASTOLIC BLOOD PRESSURE: 76 MMHG

## 2025-07-18 DIAGNOSIS — R53.83 OTHER FATIGUE: ICD-10-CM

## 2025-07-18 DIAGNOSIS — I10 ESSENTIAL HYPERTENSION: ICD-10-CM

## 2025-07-18 DIAGNOSIS — E07.9 THYROID DISORDER: ICD-10-CM

## 2025-07-18 DIAGNOSIS — C61 MALIGNANT NEOPLASM OF PROSTATE (MULTI): ICD-10-CM

## 2025-07-18 DIAGNOSIS — I49.5 SSS (SICK SINUS SYNDROME) (MULTI): ICD-10-CM

## 2025-07-18 DIAGNOSIS — Z00.00 ENCOUNTER FOR MEDICARE ANNUAL WELLNESS EXAM: ICD-10-CM

## 2025-07-18 DIAGNOSIS — Z13.89 MULTIPHASIC SCREENING: Primary | ICD-10-CM

## 2025-07-18 DIAGNOSIS — G89.29 CHRONIC RIGHT SHOULDER PAIN: ICD-10-CM

## 2025-07-18 DIAGNOSIS — M25.511 CHRONIC RIGHT SHOULDER PAIN: ICD-10-CM

## 2025-07-18 DIAGNOSIS — R73.9 HYPERGLYCEMIA: ICD-10-CM

## 2025-07-18 DIAGNOSIS — E78.5 HYPERLIPIDEMIA, UNSPECIFIED HYPERLIPIDEMIA TYPE: ICD-10-CM

## 2025-07-18 DIAGNOSIS — N18.32 CHRONIC KIDNEY DISEASE, STAGE 3B (MULTI): ICD-10-CM

## 2025-07-18 PROCEDURE — 1159F MED LIST DOCD IN RCRD: CPT | Performed by: FAMILY MEDICINE

## 2025-07-18 PROCEDURE — 3078F DIAST BP <80 MM HG: CPT | Performed by: FAMILY MEDICINE

## 2025-07-18 PROCEDURE — 96372 THER/PROPH/DIAG INJ SC/IM: CPT | Performed by: FAMILY MEDICINE

## 2025-07-18 PROCEDURE — 1036F TOBACCO NON-USER: CPT | Performed by: FAMILY MEDICINE

## 2025-07-18 PROCEDURE — G0439 PPPS, SUBSEQ VISIT: HCPCS | Performed by: FAMILY MEDICINE

## 2025-07-18 PROCEDURE — 3074F SYST BP LT 130 MM HG: CPT | Performed by: FAMILY MEDICINE

## 2025-07-18 PROCEDURE — 1160F RVW MEDS BY RX/DR IN RCRD: CPT | Performed by: FAMILY MEDICINE

## 2025-07-18 PROCEDURE — 99214 OFFICE O/P EST MOD 30 MIN: CPT | Performed by: FAMILY MEDICINE

## 2025-07-18 PROCEDURE — G0444 DEPRESSION SCREEN ANNUAL: HCPCS | Performed by: FAMILY MEDICINE

## 2025-07-18 PROCEDURE — G0446 INTENS BEHAVE THER CARDIO DX: HCPCS | Performed by: FAMILY MEDICINE

## 2025-07-18 PROCEDURE — 1170F FXNL STATUS ASSESSED: CPT | Performed by: FAMILY MEDICINE

## 2025-07-18 RX ORDER — MELOXICAM 15 MG/1
15 TABLET ORAL DAILY PRN
Qty: 30 TABLET | Refills: 1 | Status: SHIPPED | OUTPATIENT
Start: 2025-07-18

## 2025-07-18 RX ORDER — CYCLOBENZAPRINE HCL 5 MG
5 TABLET ORAL NIGHTLY PRN
Qty: 30 TABLET | Refills: 1 | Status: SHIPPED | OUTPATIENT
Start: 2025-07-18 | End: 2025-09-16

## 2025-07-18 RX ORDER — CYANOCOBALAMIN 1000 UG/ML
1000 INJECTION, SOLUTION INTRAMUSCULAR; SUBCUTANEOUS ONCE
Status: COMPLETED | OUTPATIENT
Start: 2025-07-18 | End: 2025-07-18

## 2025-07-18 RX ORDER — AMLODIPINE BESYLATE 5 MG/1
5 TABLET ORAL 2 TIMES DAILY
Qty: 180 TABLET | Refills: 3 | Status: SHIPPED | OUTPATIENT
Start: 2025-07-18

## 2025-07-18 RX ADMIN — CYANOCOBALAMIN 1000 MCG: 1000 INJECTION, SOLUTION INTRAMUSCULAR; SUBCUTANEOUS at 11:38

## 2025-07-18 ASSESSMENT — ACTIVITIES OF DAILY LIVING (ADL)
GROCERY_SHOPPING: INDEPENDENT
MANAGING_FINANCES: INDEPENDENT
TAKING_MEDICATION: INDEPENDENT
DOING_HOUSEWORK: INDEPENDENT
BATHING: INDEPENDENT
DRESSING: INDEPENDENT

## 2025-07-18 ASSESSMENT — ENCOUNTER SYMPTOMS
DEPRESSION: 0
LOSS OF SENSATION IN FEET: 0
OCCASIONAL FEELINGS OF UNSTEADINESS: 1

## 2025-07-18 NOTE — PROGRESS NOTES
Subjective   Reason for Visit: Jl Brand is a 89 y.o. male here for a Medicare Wellness visit.   Covid vax: UTD  Flu: UTD  Pneumo: UTD  RSV: UTD  Shingles: UTD   denies depression  No new issues  Just as independent as last yr    CHECKLIST REVIEWED AND COMPLETE FOR [facility] Medicare Annual Wellness Visit Subsequent, Hypertension, Chronic Kidney Disease, Hyperlipidemia, and Hypothyroidism  ---------------------------------------------------------------------------------------------  Past Medical, Surgical, and Family History reviewed and updated in chart.    Reviewed all medications by prescribing practitioner or clinical pharmacist (such as prescriptions, OTCs, herbal therapies and supplements) and documented in the medical record.    HPI    Patient Self Assessment of Health Status  Patient Self Assessment: Good    Nutrition and Exercise:    Current Diet: HEALTHY Diet always best, minimizing excess carbs,   weight reduction advised if BMI not WNL, please maintain a NORMAL BMI 18.5-24.9    Adequate Fluid Intake: Yes  Caffeine: -aware to minimize intake, <300mg best to even take in LESS  Exercise Frequency: Regularly advised, weight bearing, strengthening, aerobic    Functional Ability/Level of Safety:  Home safety addressed: no active new concerns,   fall risk addressed  NO new hearing issues or concerns  [place] in ADLs addressed and areas of assistance if present -noted    ANY Cognitive Impairment Observed: No cognitive impairment observed    Home Safety Risk Factors: None  --------------------------------------------------------------------------------------------  Patient Care Team:  Helen Hooper MD as PCP - General    HPI  Problem List[1]   Surgical History[2]      PHQ2(-) No active depressed mood or not in crisis, 5min. spent in discussion.    Review of Systems:    NO Seizures  NO CAD  NO CVA    This patient has   NO history of recent Covid nor flu symptoms,  NO Fever nor chills,  NO Chest pain,  "shortness of breath nor paroxysmal nocturnal dyspnea,  NO Nausea, vomiting, nor diarrhea,  NO Hematochezia nor melena,  NO Dysuria, hematuria, nor new incontinence issues  NO new severe headaches nor neurological complaints,  NO new issues with anxiety nor depression nor new psychiatric complaints,  NO suicidal nor homicidal ideations.  ---------------------------------------------------------------------------------------------   OBJECTIVE:  /76   Pulse 64   Temp 35.8 °C (96.4 °F) (Temporal)   Resp 16   Ht 1.778 m (5' 10\")   Wt 113 kg (249 lb)   SpO2 96%   BMI 35.73 kg/m²      General:  alert, oriented, no acute distress.  No obvious skin rashes noted.   No gait disturbance noted.    Mood is pleasant, not tearful, no signs of emotional distress.  Not appearing intoxicated or altered.   No voiced delusions,   Normal, appropriate behavior.    HEENT: Normocephalic, atraumatic,   Pupils round, reactive to light  Extraocular motions intact and wnl  Tympanic membranes normal    Neck: no nuchal rigidity  No masses palpable.  No carotid bruits.  No thyromegaly.    Respiratory: Equal breath sounds  No wheezes,    rales,    nor rhonchi  No respiratory distress.    Heart: Regular rate and rhythm, 3/6sys   murmurs  no rubs/gallops    Abdomen: no masses palpable, no rebound nor guarding, no rebound nor guarding.  ovwt  Extremities: NO cyanosis noted, no clubbing.   Trace to 1+ mildly pitting chronic edema noted.  2+dorsalis pedis pulses.    Normal-not antalgic, steady gait.  Full UE rom    Orders Only on 07/11/2025   Component Date Value Ref Range Status   • WHITE BLOOD CELL COUNT 07/14/2025 5.9  3.8 - 10.8 Thousand/uL Final   • RED BLOOD CELL COUNT 07/14/2025 4.58  4.20 - 5.80 Million/uL Final   • HEMOGLOBIN 07/14/2025 13.4  13.2 - 17.1 g/dL Final   • HEMATOCRIT 07/14/2025 41.9  38.5 - 50.0 % Final   • MCV 07/14/2025 91.5  80.0 - 100.0 fL Final   • MCH 07/14/2025 29.3  27.0 - 33.0 pg Final   • MCHC 07/14/2025 32.0 "  32.0 - 36.0 g/dL Final    Comment: For adults, a slight decrease in the calculated MCHC  value (in the range of 30 to 32 g/dL) is most likely  not clinically significant; however, it should be  interpreted with caution in correlation with other  red cell parameters and the patient's clinical  condition.     • RDW 07/14/2025 14.2  11.0 - 15.0 % Final   • PLATELET COUNT 07/14/2025 210  140 - 400 Thousand/uL Final   • MPV 07/14/2025 9.4  7.5 - 12.5 fL Final   • ABSOLUTE NEUTROPHILS 07/14/2025 3,593  1,500 - 7,800 cells/uL Final   • ABSOLUTE LYMPHOCYTES 07/14/2025 1,617  850 - 3,900 cells/uL Final   • ABSOLUTE MONOCYTES 07/14/2025 507  200 - 950 cells/uL Final   • ABSOLUTE EOSINOPHILS 07/14/2025 142  15 - 500 cells/uL Final   • ABSOLUTE BASOPHILS 07/14/2025 41  0 - 200 cells/uL Final   • NEUTROPHILS 07/14/2025 60.9  % Final   • LYMPHOCYTES 07/14/2025 27.4  % Final   • MONOCYTES 07/14/2025 8.6  % Final   • EOSINOPHILS 07/14/2025 2.4  % Final   • BASOPHILS 07/14/2025 0.7  % Final   • GLUCOSE 07/14/2025 98  65 - 99 mg/dL Final    Comment:               Fasting reference interval        • UREA NITROGEN (BUN) 07/14/2025 29 (H)  7 - 25 mg/dL Final   • CREATININE 07/14/2025 1.49 (H)  0.70 - 1.22 mg/dL Final   • EGFR 07/14/2025 45 (L)  > OR = 60 mL/min/1.73m2 Final   • SODIUM 07/14/2025 142  135 - 146 mmol/L Final   • POTASSIUM 07/14/2025 3.7  3.5 - 5.3 mmol/L Final   • CHLORIDE 07/14/2025 106  98 - 110 mmol/L Final   • CARBON DIOXIDE 07/14/2025 28  20 - 32 mmol/L Final   • ELECTROLYTE BALANCE 07/14/2025 8  7 - 17 mmol/L (calc) Final   • CALCIUM 07/14/2025 10.1  8.6 - 10.3 mg/dL Final   • PROTEIN, TOTAL 07/14/2025 7.1  6.1 - 8.1 g/dL Final   • ALBUMIN 07/14/2025 4.1  3.6 - 5.1 g/dL Final   • BILIRUBIN, TOTAL 07/14/2025 0.6  0.2 - 1.2 mg/dL Final   • ALKALINE PHOSPHATASE 07/14/2025 69  35 - 144 U/L Final   • AST 07/14/2025 12  10 - 35 U/L Final   • ALT 07/14/2025 13  9 - 46 U/L Final   • CHOLESTEROL, TOTAL 07/14/2025 118  <200  mg/dL Final   • HDL CHOLESTEROL 07/14/2025 40  > OR = 40 mg/dL Final   • TRIGLYCERIDES 07/14/2025 103  <150 mg/dL Final   • LDL-CHOLESTEROL 07/14/2025 59  mg/dL (calc) Final    Comment: Reference range: <100     Desirable range <100 mg/dL for primary prevention;    <70 mg/dL for patients with CHD or diabetic patients   with > or = 2 CHD risk factors.     LDL-C is now calculated using the Immanuel-Foote   calculation, which is a validated novel method providing   better accuracy than the Friedewald equation in the   estimation of LDL-C.   Immanuel RAI et al. IBRAHIMA. 2013;310(19): 6765-7953   (http://[employer].PPT Reasearch/faq/ERB568)     • CHOL/HDLC RATIO 07/14/2025 3.0  <5.0 (calc) Final   • NON HDL CHOLESTEROL 07/14/2025 78  <130 mg/dL (calc) Final    Comment: For patients with diabetes plus 1 major ASCVD risk   factor, treating to a non-HDL-C goal of <100 mg/dL   (LDL-C of <70 mg/dL) is considered a therapeutic   option.     • HEMOGLOBIN A1c 07/14/2025 5.5  <5.7 % Final    Comment: For the purpose of screening for the presence of  diabetes:     <5.7%       Consistent with the absence of diabetes  5.7-6.4%    Consistent with increased risk for diabetes              (prediabetes)  > or =6.5%  Consistent with diabetes     This assay result is consistent with a decreased risk  of diabetes.     Currently, no consensus exists regarding use of  hemoglobin A1c for diagnosis of diabetes in children.     According to American Diabetes Association (ADA)  guidelines, hemoglobin A1c <7.0% represents optimal  control in non-pregnant diabetic patients. Different  metrics may apply to specific patient populations.   Standards of Medical Care in Diabetes(ADA).         • eAG (mg/dL) 07/14/2025 111  mg/dL Final   • eAG (mmol/L) 07/14/2025 6.2  mmol/L Final   • TSH 07/14/2025 1.51  0.40 - 4.50 mIU/L Final   • T4, FREE 07/14/2025 1.1  0.8 - 1.8 ng/dL Final        Assessment/Plan     Problem List Items Addressed This Visit        Chronic kidney disease, stage 3b (Multi)    Relevant Medications    amLODIPine (Norvasc) 5 mg tablet    Hyperglycemia    Malignant neoplasm of prostate (Multi)    Thyroid disorder    Essential hypertension    Relevant Medications    amLODIPine (Norvasc) 5 mg tablet    HLD (hyperlipidemia)    SSS (sick sinus syndrome) (Multi)    Relevant Medications    amLODIPine (Norvasc) 5 mg tablet     Other Visit Diagnoses         Encounter for Medicare annual wellness exam              Advance Care Planning Note   Discussion Date: 07/18/25   Discussion Participants: patient  16 min spent discussing ACP w pt    The patient wishes to discuss Advance Care Planning today and the following is a brief summary of our discussion.     Patient has capacity to make their own medical decisions: Yes  Health Care Agent/Surrogate Decision Maker documented in chart: Yes    Documents on file and valid:  Advance Directive/Living Will: Yes or advised today  Health Care Power of : Yes or advised today  Communication of Medical Status/Prognosis:   yes   Communication of Treatment Goals/Options:   yes  Treatment Decisions/involved with patient today  yes  Time Statement: Total face to face time spent on advance care planning was <30 minutes with <30 minutes spent in counseling, including the explanation.    SEE ME AT NEXT REGULARLY SCHEDULED VISIT-sooner if condition deteriorates or new problems arise.    PATIENT WOULD LIKE TO BE A FULL CODE    NO uncontrolled DEPRESSION noted  Assessed and reviewed for opioid use.  NO EVIDENCE OF SIGNIFICANT DEMENTIA    Signs and symptoms of concern with depression-if in crisis -(no current HI/SI) will let us know and proceed to ER   Signs/symptoms of concern with dementia-and need to contact us if they occur discussed.  Hba1c 5.5  Ldl 59  Cr 1.49-sees nephro  Takes mobic and flexeril rarely and helpful(took dtrs) reqs rx  This medications risks, benefits, and alternatives were discussed with patient at  length.  If any unwanted side effects occur-discontinue medicine and call the office for discussion.    ASCVD   over 89 so high   addressed and risk reduction conversation took place  Sees dr matute  Walking regimen d/w pt started    All above counseling 15 minutes in conversation/documentation etc    Mood counseling 5min- no uncontrolled depression, good support system, has crisis plan if occurs.  Included BMI counseling and options if BMI>30        QUESTIONS answered        Next visit addressed -regular visit as scheduled and follow up sooner if condition deterioration or new problems arise.    This completes Jl Brand ANNUAL MEDICARE WELLNESS VISIT today.  If no other follow ups discussed: Please follow up in 1 year for NEXT ANNUAL MEDICARE WELLNESS VISIT.  Jl Brand -be aware that any referrals discussed should be placed today or tests/labs ordered should result in prompt scheduling today.   If not done today-then a phone call for scheduling is expected in a timely manner(within 2 weeks).   If testing is to be done-a result should be available to patient within 2 weeks time unless otherwise specified.   You, the patient or caregiver, are responsible for making sure what was discussed is actually scheduled and completed.  If suboptimal understanding of results of tests or referral reason-a follow up appointment with me should be made.  If above does NOT occur-you are to connect with us for an explanation.    Follow up at next scheduled visit -as planned or directed today.  Sooner if new or unresolved issues of concern.    Jl Brand We know you have a choice for your health care, THANK YOU for choosing  and [facility].  We APPRECIATE YOU.  Sincerely,   Helen Hooper MD   (dr. Finley)      This documentation is subject to inadvertent typing and other similar clerical/grammatic errors etc.               [1]  Patient Active Problem List  Diagnosis   • Chronic kidney disease, stage 3b  (Multi)   • Gout   • Hyperglycemia   • Low vitamin D level   • Malignant neoplasm of prostate (Multi)   • Otomycosis   • Overactive bladder   • Thyroid disorder   • Obesity, morbid (Multi)   • Obesity (BMI 30-39.9)   • Primary osteoarthritis of left knee   • Primary osteoarthritis of right knee   • Other specified glaucoma   • Essential hypertension   • HLD (hyperlipidemia)   • Bradycardia   • SSS (sick sinus syndrome) (Multi)   • Paroxysmal supraventricular tachycardia   [2]  Past Surgical History:  Procedure Laterality Date   • CATARACT EXTRACTION  2015   • COLONOSCOPY  2017   • JOINT REPLACEMENT     • TOTAL KNEE ARTHROPLASTY Right 2021

## 2025-08-19 ENCOUNTER — APPOINTMENT (OUTPATIENT)
Dept: PRIMARY CARE | Facility: CLINIC | Age: 89
End: 2025-08-19
Payer: MEDICARE

## 2025-08-19 DIAGNOSIS — E56.9 VITAMIN DEFICIENCY: ICD-10-CM

## 2025-08-19 PROCEDURE — 96372 THER/PROPH/DIAG INJ SC/IM: CPT | Performed by: FAMILY MEDICINE

## 2025-08-19 RX ORDER — CYANOCOBALAMIN 1000 UG/ML
1000 INJECTION, SOLUTION INTRAMUSCULAR; SUBCUTANEOUS
Status: SHIPPED | OUTPATIENT
Start: 2025-08-19 | End: 2026-07-21

## 2025-08-19 RX ADMIN — CYANOCOBALAMIN 1000 MCG: 1000 INJECTION, SOLUTION INTRAMUSCULAR; SUBCUTANEOUS at 11:00

## 2025-09-17 ENCOUNTER — APPOINTMENT (OUTPATIENT)
Dept: CARDIOLOGY | Facility: CLINIC | Age: 89
End: 2025-09-17
Payer: MEDICARE

## 2025-09-18 ENCOUNTER — APPOINTMENT (OUTPATIENT)
Dept: PRIMARY CARE | Facility: CLINIC | Age: 89
End: 2025-09-18
Payer: MEDICARE

## 2025-10-06 ENCOUNTER — APPOINTMENT (OUTPATIENT)
Dept: CARDIOLOGY | Facility: CLINIC | Age: 89
End: 2025-10-06
Payer: MEDICARE

## 2026-03-18 ENCOUNTER — APPOINTMENT (OUTPATIENT)
Dept: PRIMARY CARE | Facility: CLINIC | Age: OVER 89
End: 2026-03-18
Payer: MEDICARE